# Patient Record
Sex: MALE | Race: WHITE | NOT HISPANIC OR LATINO | Employment: UNEMPLOYED | URBAN - METROPOLITAN AREA
[De-identification: names, ages, dates, MRNs, and addresses within clinical notes are randomized per-mention and may not be internally consistent; named-entity substitution may affect disease eponyms.]

---
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
## 2021-09-22 ENCOUNTER — HOSPITAL ENCOUNTER (EMERGENCY)
Facility: HOSPITAL | Age: 21
Discharge: HOME | End: 2021-09-22
Attending: EMERGENCY MEDICINE | Admitting: EMERGENCY MEDICINE
Payer: COMMERCIAL

## 2021-09-22 ENCOUNTER — APPOINTMENT (EMERGENCY)
Dept: CT IMAGING | Facility: HOSPITAL | Age: 21
End: 2021-09-22
Payer: COMMERCIAL

## 2021-09-22 VITALS
BODY MASS INDEX: 25.73 KG/M2 | RESPIRATION RATE: 20 BRPM | HEART RATE: 89 BPM | SYSTOLIC BLOOD PRESSURE: 152 MMHG | WEIGHT: 190 LBS | DIASTOLIC BLOOD PRESSURE: 81 MMHG | HEIGHT: 72 IN | TEMPERATURE: 97.9 F | OXYGEN SATURATION: 98 %

## 2021-09-22 DIAGNOSIS — R10.9 ABDOMINAL PAIN: Primary | ICD-10-CM

## 2021-09-22 LAB
ALBUMIN SERPL BCP-MCNC: 4.5 G/DL (ref 3.5–5)
ALP SERPL-CCNC: 98 U/L (ref 46–116)
ALT SERPL W P-5'-P-CCNC: 44 U/L (ref 12–78)
ANION GAP SERPL CALCULATED.3IONS-SCNC: 7 MMOL/L (ref 4–13)
AST SERPL W P-5'-P-CCNC: 16 U/L (ref 5–45)
BASOPHILS # BLD AUTO: 0.05 THOUSANDS/ΜL (ref 0–0.1)
BASOPHILS NFR BLD AUTO: 1 % (ref 0–1)
BILIRUB SERPL-MCNC: 0.39 MG/DL (ref 0.2–1)
BUN SERPL-MCNC: 14 MG/DL (ref 5–25)
CALCIUM SERPL-MCNC: 9.3 MG/DL (ref 8.3–10.1)
CHLORIDE SERPL-SCNC: 102 MMOL/L (ref 100–108)
CO2 SERPL-SCNC: 30 MMOL/L (ref 21–32)
CREAT SERPL-MCNC: 0.97 MG/DL (ref 0.6–1.3)
EOSINOPHIL # BLD AUTO: 0.06 THOUSAND/ΜL (ref 0–0.61)
EOSINOPHIL NFR BLD AUTO: 1 % (ref 0–6)
ERYTHROCYTE [DISTWIDTH] IN BLOOD BY AUTOMATED COUNT: 12.9 % (ref 11.6–15.1)
GFR SERPL CREATININE-BSD FRML MDRD: 110 ML/MIN/1.73SQ M
GLUCOSE SERPL-MCNC: 115 MG/DL (ref 65–140)
HCT VFR BLD AUTO: 44.7 % (ref 36.5–49.3)
HGB BLD-MCNC: 15 G/DL (ref 12–17)
IMM GRANULOCYTES # BLD AUTO: 0.01 THOUSAND/UL (ref 0–0.2)
IMM GRANULOCYTES NFR BLD AUTO: 0 % (ref 0–2)
LIPASE SERPL-CCNC: 88 U/L (ref 73–393)
LYMPHOCYTES # BLD AUTO: 2.28 THOUSANDS/ΜL (ref 0.6–4.47)
LYMPHOCYTES NFR BLD AUTO: 29 % (ref 14–44)
MCH RBC QN AUTO: 28.6 PG (ref 26.8–34.3)
MCHC RBC AUTO-ENTMCNC: 33.6 G/DL (ref 31.4–37.4)
MCV RBC AUTO: 85 FL (ref 82–98)
MONOCYTES # BLD AUTO: 0.47 THOUSAND/ΜL (ref 0.17–1.22)
MONOCYTES NFR BLD AUTO: 6 % (ref 4–12)
NEUTROPHILS # BLD AUTO: 4.97 THOUSANDS/ΜL (ref 1.85–7.62)
NEUTS SEG NFR BLD AUTO: 63 % (ref 43–75)
NRBC BLD AUTO-RTO: 0 /100 WBCS
PLATELET # BLD AUTO: 238 THOUSANDS/UL (ref 149–390)
PMV BLD AUTO: 9.5 FL (ref 8.9–12.7)
POTASSIUM SERPL-SCNC: 4.1 MMOL/L (ref 3.5–5.3)
PROT SERPL-MCNC: 8 G/DL (ref 6.4–8.2)
RBC # BLD AUTO: 5.24 MILLION/UL (ref 3.88–5.62)
SODIUM SERPL-SCNC: 139 MMOL/L (ref 136–145)
WBC # BLD AUTO: 7.84 THOUSAND/UL (ref 4.31–10.16)

## 2021-09-22 PROCEDURE — 74177 CT ABD & PELVIS W/CONTRAST: CPT

## 2021-09-22 PROCEDURE — G1004 CDSM NDSC: HCPCS

## 2021-09-22 PROCEDURE — 99284 EMERGENCY DEPT VISIT MOD MDM: CPT

## 2021-09-22 PROCEDURE — 36415 COLL VENOUS BLD VENIPUNCTURE: CPT | Performed by: PHYSICIAN ASSISTANT

## 2021-09-22 PROCEDURE — 83690 ASSAY OF LIPASE: CPT | Performed by: PHYSICIAN ASSISTANT

## 2021-09-22 PROCEDURE — 96360 HYDRATION IV INFUSION INIT: CPT

## 2021-09-22 PROCEDURE — 80053 COMPREHEN METABOLIC PANEL: CPT | Performed by: PHYSICIAN ASSISTANT

## 2021-09-22 PROCEDURE — 99284 EMERGENCY DEPT VISIT MOD MDM: CPT | Performed by: PHYSICIAN ASSISTANT

## 2021-09-22 PROCEDURE — 85025 COMPLETE CBC W/AUTO DIFF WBC: CPT | Performed by: PHYSICIAN ASSISTANT

## 2021-09-22 RX ADMIN — IOHEXOL 100 ML: 350 INJECTION, SOLUTION INTRAVENOUS at 16:03

## 2021-09-22 RX ADMIN — SODIUM CHLORIDE 1000 ML: 0.9 INJECTION, SOLUTION INTRAVENOUS at 15:44

## 2021-09-22 NOTE — ED PROVIDER NOTES
History  Chief Complaint   Patient presents with    Abdominal Pain     right upper quadrant pain reports it to be stabbing in nature which started last night  denies n,v,d      Patient presents to the emergency department today for evaluation right-sided abdominal pain  This is a very pleasant 23-year-old male who states that his symptoms began last evening around 1800 hours  This was not in relation to a meal   He states it has been waxing and waning since that point however worsening today  He had a similar episode about a month ago that lasted 15 hours before spontaneous resolution  He denies vomiting or appetite change, he denies constipation or diarrhea  Denies testicular pain or problems urinating  No fever  Pain may slightly radiating into the right side of the back upon questioning  He denies prior abdominal surgical history  None       History reviewed  No pertinent past medical history  History reviewed  No pertinent surgical history  History reviewed  No pertinent family history  I have reviewed and agree with the history as documented  E-Cigarette/Vaping     E-Cigarette/Vaping Substances     Social History     Tobacco Use    Smoking status: Never Smoker   Substance Use Topics    Alcohol use: No    Drug use: No       Review of Systems   Constitutional: Negative  HENT: Negative  Eyes: Negative  Respiratory: Negative  Cardiovascular: Negative  Gastrointestinal: Positive for abdominal pain  Endocrine: Negative  Genitourinary: Negative  Musculoskeletal: Positive for back pain  Skin: Negative  Allergic/Immunologic: Negative  Neurological: Negative  Hematological: Negative  Psychiatric/Behavioral: Negative  All other systems reviewed and are negative  Physical Exam  Physical Exam  Constitutional:       General: He is not in acute distress  Appearance: He is well-developed  He is not ill-appearing, toxic-appearing or diaphoretic  HENT:      Right Ear: External ear normal  No swelling  Tympanic membrane is not bulging  Left Ear: External ear normal  No swelling  Tympanic membrane is not bulging  Nose: Nose normal       Mouth/Throat:      Pharynx: No oropharyngeal exudate  Eyes:      General: Lids are normal       Conjunctiva/sclera: Conjunctivae normal       Pupils: Pupils are equal, round, and reactive to light  Neck:      Thyroid: No thyromegaly  Vascular: No JVD  Trachea: No tracheal deviation  Cardiovascular:      Rate and Rhythm: Normal rate and regular rhythm  Pulses: Normal pulses  Heart sounds: Normal heart sounds  No murmur heard  No friction rub  No gallop  Pulmonary:      Effort: Pulmonary effort is normal  No respiratory distress  Breath sounds: Normal breath sounds  No stridor  No wheezing or rales  Chest:      Chest wall: No tenderness  Abdominal:      General: Bowel sounds are normal  There is no distension  Palpations: Abdomen is soft  There is no mass  Tenderness: There is abdominal tenderness in the right upper quadrant and right lower quadrant  There is guarding  There is no rebound  Positive signs include Mooney's sign  Hernia: No hernia is present  Musculoskeletal:         General: Normal range of motion  Cervical back: Normal range of motion and neck supple  No edema  Normal range of motion  Lymphadenopathy:      Cervical: No cervical adenopathy  Skin:     General: Skin is warm and dry  Coloration: Skin is not pale  Findings: No erythema or rash  Neurological:      Mental Status: He is alert and oriented to person, place, and time  GCS: GCS eye subscore is 4  GCS verbal subscore is 5  GCS motor subscore is 6  Cranial Nerves: No cranial nerve deficit  Sensory: No sensory deficit  Deep Tendon Reflexes: Reflexes are normal and symmetric     Psychiatric:         Mood and Affect: Mood normal  Mood is not anxious or depressed           Speech: Speech normal          Behavior: Behavior normal          Vital Signs  ED Triage Vitals [09/22/21 1509]   Temperature Pulse Respirations Blood Pressure SpO2   97 9 °F (36 6 °C) 89 20 152/81 98 %      Temp Source Heart Rate Source Patient Position - Orthostatic VS BP Location FiO2 (%)   Tympanic Monitor Sitting Right arm --      Pain Score       --           Vitals:    09/22/21 1509   BP: 152/81   Pulse: 89   Patient Position - Orthostatic VS: Sitting         Visual Acuity      ED Medications  Medications   sodium chloride 0 9 % bolus 1,000 mL (1,000 mL Intravenous New Bag 9/22/21 1544)   iohexol (OMNIPAQUE) 350 MG/ML injection (SINGLE-DOSE) 100 mL (100 mL Intravenous Given 9/22/21 1603)       Diagnostic Studies  Results Reviewed     Procedure Component Value Units Date/Time    Comprehensive metabolic panel [[id number]] Collected: 09/22/21 1531    Lab Status: Final result Specimen: Blood from Arm, Right Updated: 09/22/21 1553     Sodium 139 mmol/L      Potassium 4 1 mmol/L      Chloride 102 mmol/L      CO2 30 mmol/L      ANION GAP 7 mmol/L      BUN 14 mg/dL      Creatinine 0 97 mg/dL      Glucose 115 mg/dL      Calcium 9 3 mg/dL      AST 16 U/L      ALT 44 U/L      Alkaline Phosphatase 98 U/L      Total Protein 8 0 g/dL      Albumin 4 5 g/dL      Total Bilirubin 0 39 mg/dL      eGFR 110 ml/min/1 73sq m     Narrative:      Ashley guidelines for Chronic Kidney Disease (CKD):     Stage 1 with normal or high GFR (GFR > 90 mL/min/1 73 square meters)    Stage 2 Mild CKD (GFR = 60-89 mL/min/1 73 square meters)    Stage 3A Moderate CKD (GFR = 45-59 mL/min/1 73 square meters)    Stage 3B Moderate CKD (GFR = 30-44 mL/min/1 73 square meters)    Stage 4 Severe CKD (GFR = 15-29 mL/min/1 73 square meters)    Stage 5 End Stage CKD (GFR <15 mL/min/1 73 square meters)  Note: GFR calculation is accurate only with a steady state creatinine    Lipase [[id number]]  (Normal) Collected: 09/22/21 1531    Lab Status: Final result Specimen: Blood from Arm, Right Updated: 09/22/21 1546     Lipase 88 u/L     CBC and differential [[id number]] Collected: 09/22/21 1531    Lab Status: Final result Specimen: Blood from Arm, Right Updated: 09/22/21 1539     WBC 7 84 Thousand/uL      RBC 5 24 Million/uL      Hemoglobin 15 0 g/dL      Hematocrit 44 7 %      MCV 85 fL      MCH 28 6 pg      MCHC 33 6 g/dL      RDW 12 9 %      MPV 9 5 fL      Platelets 979 Thousands/uL      nRBC 0 /100 WBCs      Neutrophils Relative 63 %      Immat GRANS % 0 %      Lymphocytes Relative 29 %      Monocytes Relative 6 %      Eosinophils Relative 1 %      Basophils Relative 1 %      Neutrophils Absolute 4 97 Thousands/µL      Immature Grans Absolute 0 01 Thousand/uL      Lymphocytes Absolute 2 28 Thousands/µL      Monocytes Absolute 0 47 Thousand/µL      Eosinophils Absolute 0 06 Thousand/µL      Basophils Absolute 0 05 Thousands/µL                  CT abdomen pelvis with contrast   Final Result by Aixa Acosta MD (09/22 1630)      Unremarkable CT of the abdomen and pelvis  Normal appendix  Workstation performed: [id number]                    Procedures  Procedures         ED Course  ED Course as of Sep 22 1657   Wed Sep 22, 2021   1519 SpO2: 98 %   1519 Respirations: 20   1519 Pulse: 89   1519 Temperature: 97 9 °F (36 6 °C)   1519 Blood Pressure: 152/81   1551 Lipase: 88   1551 WBC: 7 84   1551 Hemoglobin: 15 0   1551 Platelet Count: 066   1556 WBC: 7 84   1557 Hemoglobin: 15 0   1557 Platelet Count: 233   1557 Sodium: 139   1557 TOTAL BILIRUBIN: 0 39   1608 Awaiting ct read      8217 FINDINGS:     ABDOMEN     LOWER CHEST:  No clinically significant abnormality identified in the visualized lower chest      LIVER/BILIARY TREE:  Unremarkable      GALLBLADDER:  No calcified gallstones   No pericholecystic inflammatory change      SPLEEN:  Unremarkable      PANCREAS:  Unremarkable      ADRENAL GLANDS: Unremarkable      KIDNEYS/URETERS:  Unremarkable  No hydronephrosis      STOMACH AND BOWEL:  Unremarkable      APPENDIX:  A normal appendix was visualized      ABDOMINOPELVIC CAVITY:  No ascites  No pneumoperitoneum  No lymphadenopathy      VESSELS:  Unremarkable for patient's age      PELVIS     REPRODUCTIVE ORGANS:  Unremarkable for patient's age      URINARY BLADDER:  Unremarkable      ABDOMINAL WALL/INGUINAL REGIONS:  Unremarkable      OSSEOUS STRUCTURES:  No acute fracture or destructive osseous lesion      IMPRESSION:     Unremarkable CT of the abdomen and pelvis  Normal appendix                                    SBIRT 22yo+      Most Recent Value   SBIRT (22 yo +)   In order to provide better care to our patients, we are screening all of our patients for alcohol and drug use  Would it be okay to ask you these screening questions? No Filed at: 09/22/2021 1534                    MDM    Disposition  Final diagnoses:   Abdominal pain     Time reflects when diagnosis was documented in both MDM as applicable and the Disposition within this note     Time User Action Codes Description Comment    9/22/2021  4:56 PM Elton Hammad CALZADA Add [R10 9] Abdominal pain       ED Disposition     ED Disposition Condition Date/Time Comment    Discharge Stable Wed Sep 22, 2021  4:56 PM Ariane Edward discharge to home/self care              Follow-up Information     Follow up With Specialties Details Why Contact Info Additional Information    Calleen [place], MD Family Medicine Schedule an appointment as soon as possible for a visit  As needed [address]       [facility] Schedule an appointment as soon as possible for a visit  As needed [address] [address]  Renny Marrero [facility], Juan Alberto Hoang, [address] [place] [address]          Patient's Medications    No medications on file     No discharge procedures on file      PDMP Review     None          ED Provider  Electronically Signed by           Radu Zhang PA-C  09/22/21 0969

## 2023-06-18 ENCOUNTER — APPOINTMENT (EMERGENCY)
Dept: RADIOLOGY | Facility: HOSPITAL | Age: 23
End: 2023-06-18
Payer: COMMERCIAL

## 2023-06-18 ENCOUNTER — HOSPITAL ENCOUNTER (EMERGENCY)
Facility: HOSPITAL | Age: 23
Discharge: HOME | End: 2023-06-18
Attending: EMERGENCY MEDICINE
Payer: COMMERCIAL

## 2023-06-18 VITALS
BODY MASS INDEX: 23.1 KG/M2 | OXYGEN SATURATION: 97 % | DIASTOLIC BLOOD PRESSURE: 71 MMHG | WEIGHT: 165 LBS | TEMPERATURE: 98.7 F | HEART RATE: 96 BPM | SYSTOLIC BLOOD PRESSURE: 135 MMHG | HEIGHT: 71 IN | RESPIRATION RATE: 20 BRPM
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
## 2023-06-18 PROCEDURE — 99283 EMERGENCY DEPT VISIT LOW MDM: CPT

## 2023-06-18 PROCEDURE — 73090 X-RAY EXAM OF FOREARM: CPT

## 2023-06-18 PROCEDURE — 90715 TDAP VACCINE 7 YRS/> IM: CPT | Performed by: EMERGENCY MEDICINE

## 2023-06-18 PROCEDURE — 90471 IMMUNIZATION ADMIN: CPT

## 2023-06-18 PROCEDURE — 73130 X-RAY EXAM OF HAND: CPT

## 2023-06-18 RX ORDER — IBUPROFEN 800 MG/1
800 TABLET ORAL ONCE
Status: COMPLETED | OUTPATIENT
Start: 2023-06-18 | End: 2023-06-18

## 2023-06-18 RX ORDER — GINSENG 100 MG
1 CAPSULE ORAL 2 TIMES DAILY
Qty: 28 G | Refills: 0 | Status: SHIPPED | OUTPATIENT
Start: 2023-06-18

## 2023-06-18 RX ORDER — AMOXICILLIN AND CLAVULANATE POTASSIUM 875; 125 MG/1; MG/1
1 TABLET, FILM COATED ORAL ONCE
Status: COMPLETED | OUTPATIENT
Start: 2023-06-18 | End: 2023-06-18

## 2023-06-18 RX ORDER — GINSENG 100 MG
1 CAPSULE ORAL ONCE
Status: COMPLETED | OUTPATIENT
Start: 2023-06-18 | End: 2023-06-18

## 2023-06-18 RX ORDER — AMOXICILLIN AND CLAVULANATE POTASSIUM 875; 125 MG/1; MG/1
1 TABLET, FILM COATED ORAL EVERY 12 HOURS
Qty: 14 TABLET | Refills: 0 | Status: SHIPPED | OUTPATIENT
Start: 2023-06-18 | End: 2023-06-25

## 2023-06-18 RX ORDER — IBUPROFEN 800 MG/1
800 TABLET ORAL EVERY 8 HOURS PRN
Qty: 21 TABLET | Refills: 0 | Status: SHIPPED | OUTPATIENT
Start: 2023-06-18

## 2023-06-18 RX ADMIN — BACITRACIN ZINC 1 SMALL APPLICATION: 500 OINTMENT TOPICAL at 22:14

## 2023-06-18 RX ADMIN — IBUPROFEN 800 MG: 800 TABLET ORAL at 21:29

## 2023-06-18 RX ADMIN — AMOXICILLIN AND CLAVULANATE POTASSIUM 1 TABLET: 875; 125 TABLET, FILM COATED ORAL at 21:29

## 2023-06-18 RX ADMIN — TETANUS TOXOID, REDUCED DIPHTHERIA TOXOID AND ACELLULAR PERTUSSIS VACCINE, ADSORBED 0.5 ML: 5; 2.5; 8; 8; 2.5 SUSPENSION INTRAMUSCULAR at 21:29

## 2023-06-18 NOTE — Clinical Note
Rene Bryant was seen and treated in our emergency department on 6/18/2023  Other - See Comments        Diagnosis:     Trejo Current    He may return on this date:     No work until cleared by Orthopedic Surgeon  If you have any questions or concerns, please don't hesitate to call        Ritika Suarez MD    ______________________________           _______________          _______________  Hospital Representative                              Date                                Time

## 2023-06-19 ENCOUNTER — VBI (OUTPATIENT)
Dept: ADMINISTRATIVE | Facility: OTHER | Age: 23
End: 2023-06-19

## 2023-06-19 NOTE — ED PROVIDER NOTES
EMERGENCY DEPARTMENT ENCOUNTER NOTE    This note has been generated using a voice recognition software  There may be typographic, grammatic, or word substitution errors that have escaped editorial review  Emergency Department Note- Kelly Redd 24 y o  male MRN: [id number]    Unit/Bed#: Flavia Murrieta Encounter: [id number]  ? CHIEF COMPLAINT  Chief Complaint   Patient presents with   • Dog Bite     Patient reports was bite by his neighbors dog while trying to separate a fight between his dog and the neighbor dog  Unknown if the dog is up to date on shots  HPI  Kelly Redd is a 24 y o  male presenting with puncture wounds and lacerations after sustaining bites from his neighbors dog  Patient reports that shortly prior to arrival, neighbors dog answered his backyard and attacked his dog  Patient was attempting to separate the dogs and was bitten by the neighbors dog predominantly on the left wrist and forearm but also on the right hand  It is unclear whether the dog is up-to-date on immunizations  Patient's last tetanus shot was in 2012 and is out of date  Patient has a significant amount of pain, particularly in his left upper extremity  He has no other injuries  There is no numbness or weakness in either upper extremity  REVIEW OF SYSTEMS    Cardiac: no chest pain  Respiratory: no difficulty breathing  MSK: As above  Endocrine: no diabetes  Neuro: no new focal weakness or numbness    PAST MEDICAL HISTORY  History reviewed  No pertinent past medical history  SURGICAL HISTORY  History reviewed  No pertinent surgical history  FAMILY HISTORY  History reviewed  No pertinent family history  CURRENT MEDICATIONS  No current facility-administered medications on file prior to encounter  No current outpatient medications on file prior to encounter         ALLERGIES  No Known Allergies    SOCIAL HISTORY  Social History     Socioeconomic History   • Marital status: Single     Spouse name: None   • "Number of children: None   • Years of education: None   • Highest education level: None   Occupational History   • None   Tobacco Use   • Smoking status: Never   • Smokeless tobacco: None   Substance and Sexual Activity   • Alcohol use: No   • Drug use: No   • Sexual activity: None   Other Topics Concern   • None   Social History Narrative   • None     Social Determinants of Health     Financial Resource Strain: Not on file   Food Insecurity: Not on file   Transportation Needs: Not on file   Physical Activity: Not on file   Stress: Not on file   Social Connections: Not on file   Intimate Partner Violence: Not on file   Housing Stability: Not on file       PHYSICAL EXAM    /71 (BP Location: Right arm)   Pulse 96   Temp 98 7 °F (37 1 °C) (Temporal)   Resp 20   Ht 5' 11\" (1 803 m)   Wt 74 8 kg (165 lb)   SpO2 97%   BMI 23 01 kg/m²   Vital signs and nursing notes reviewed    Constitutional:  Awake, alert, oriented  No acute distress  HEENT:  Normocephalic, atraumatic  Sclera anicteric, conjunctiva not injected  Moist oral mucosa  Cardiac:  Appears well-perfused  Respiratory:  Breathing comfortably on room air  Abdomen:  Nondistended  Extremities: Examination of left upper extremity reveals puncture wounds and lacerations to patient's mid and distal forearm as well as superficial abrasions and superficial lacerations to left hand  There are also several superficial abrasions and lacerations to patient's right hand, particularly to the second (index) finger distal interphalangeal joint  Patient is able to move wrists bilaterally, though movement of left wrist is painful, he is able to demonstrate intact movement and MCPs, PIPs, and DIPs in all fingers of bilateral upper extremities  Sensation to light touch is intact in median, radial, and ulnar nerve distributions bilaterally  2+ radial pulses bilaterally    Integument: As per extremities exam, cap refill less than 2 seconds  Neurologic:  Awake, " alert, and oriented x3  Nonfocal exam   Psychiatric:  Normal affect    ? LABS AND TESTS    Results Reviewed     None          XR forearm 2 views LEFT   ED Interpretation by Denys Marshall MD (06/18 2118)   Gas in tissues due to dog bite puncture wounds, no obvious fractures, no radiopaque foreign bodies  Formal read pending  Final Result by Heath Schofield MD (06/19 8426)      No acute osseous abnormality  Workstation performed: [id number]         XR hand 3+ views LEFT   ED Interpretation by Denys Marshall MD (06/18 2119)   No acute fracture or dislocation  No radiopaque foreign bodies  Formal read pending  Final Result by Sugey Porter MD (06/19 2217)      No acute osseous abnormality  Workstation performed: [id number]             ED COURSE & MEDICAL DECISION MAKING  Procedures             Medications   amoxicillin-clavulanate (AUGMENTIN) 875-125 mg per tablet 1 tablet (1 tablet Oral Given 6/18/23 2129)   ibuprofen (MOTRIN) tablet 800 mg (800 mg Oral Given 6/18/23 2129)   tetanus-diphtheria-acellular pertussis (BOOSTRIX) IM injection 0 5 mL (0 5 mL Intramuscular Given 6/18/23 2129)   bacitracin topical ointment 1 small application (1 small application Topical Given 6/18/23 254)     57-year-old male presenting with dog bite wounds to bilateral upper extremities, left worse than right  Vital signs reviewed, afebrile, within normal limits  Police are involved  At present, it is unclear whether the neighbors dog is up-to-date on immunizations  If the dog is found to not be up-to-date on immunizations, patient is to return to emergency department for rabies series  Thankfully, we do have time to initiate the series given distal location of the bites relative to the brain  Patient's tetanus updated  X-rays of left hand and forearm obtained, to my review no fractures, no radiopaque foreign bodies  Motrin administered for pain  First dose of Augmentin administered  I irrigated all the lacerations and abrasions  Unfortunately, no repair is possible due to increased risk of infection  Bacitracin applied to the most extensive wounds  Patient discharged home with Renee Davis for Augmentin, recommendations for pain control, strict return precautions and recommendations for follow-up  Medical Decision Making  Dog bite, initial encounter: acute illness or injury  Amount and/or Complexity of Data Reviewed  Radiology: ordered and independent interpretation performed  Decision-making details documented in ED Course  Risk  OTC drugs  Prescription drug management  CLINICAL IMPRESSION  Final diagnoses:   Dog bite, initial encounter       DISPOSITION  Time reflects when diagnosis was documented in both MDM as applicable and the Disposition within this note     Time User Action Codes Description Comment    6/18/2023  9:19 PM Yunier Yin  0XXA] Dog bite, initial encounter       ED Disposition     ED Disposition   Discharge    Condition   Stable    Date/Time   Sun Jun 18, 2023  9:19 PM    Comment   [address] discharge to home/self care                 Follow-up Information     Follow up With Specialties Details Why Contact Info Additional [address] [facility] Orthopedic Surgery Schedule an appointment as soon as possible for a visit in 3 days Emergency Room Follow-up [address] [facility],[address] [phone]  [address] [facility] Davina Broussard [address] [facility], [facility], [place], [address]    [facility] Emergency Department Emergency Medicine Go to  As needed, If symptoms worsen [address] [phone]  [address] [facility] Emergency Department, [address], [address]   Discharge Medication List as of 6/18/2023  9:57 PM      START taking these medications Details   amoxicillin-clavulanate (AUGMENTIN) 875-125 mg per tablet Take 1 tablet by mouth every 12 (twelve) hours for 7 days, Starting Sun 6/18/2023, Until Sun 6/25/2023, Normal      bacitracin topical ointment 500 units/g topical ointment Apply 1 large application topically 2 (two) times a day, Starting Sun 6/18/2023, Normal      ibuprofen (MOTRIN) 800 mg tablet Take 1 tablet (800 mg total) by mouth every 8 (eight) hours as needed for mild pain or moderate pain, Starting Sun 6/18/2023, Normal                 Kwesi Pride MD  06/24/23 8787

## 2023-06-19 NOTE — DISCHARGE INSTRUCTIONS
Please take Augmentin and complete entire course of the antibiotic to help minimize chance of infection due to the dog bite  Take ibuprofen 800 mg every 8 hours as needed for pain, you may also take Tylenol 500 mg every 4-6 hours for pain, do not exceed 4000 mg of Tylenol in 24 hours  You may apply bacitracin to your lacerations to help promote healing, apply the ointment twice daily while the wounds are open  Please follow-up with orthopedics for reevaluation of your dog bites, particularly to your wrist and hand  Seek medical attention if you have worsening swelling, pain, redness, pus draining from the wounds, red streaks traveling up your arm, or fevers  You may shower, let water run over your wounds and pat the wounds dry  Do not submerge the wounds in water and do not swim in a pool or lake

## 2023-06-19 NOTE — TELEPHONE ENCOUNTER
Rubina Mitchell    ED Visit Information     Ed visit RNRN:[id number]  Diagnosis Description: dog bite   In Network? Yes Kurt Miller  Discharge status: Home  Discharged with meds ? Yes  Number of ED visits to date: 1  ED Severity:3     Outreach Information    Outreach successful: Yes 1  Date letter mailed:NA  Date [address] Coordination    Follow up with specialist 6-21-23  Transportation issues ?  NA    [employer] type: 3 Day Outreach  Emergent necessity warranted by diagnosis: Yes  [facility] PCP: Yes  Transportation: Self Transport  Called PCP first?: No  Feels able to call PCP for urgent problems ?: Yes  Understands what emergencies can be handled by PCP ?: Yes  Ever any problems getting appointment with PCP for minor emergency/urgency problems?: No  Practice Contacted Patient ?: No  Pt had ED follow up with pcp/staff ?: No    Reason Patient went to ED instead of Urgent Care or PCP?: Perceived Severity of Illness  Urgent care Education?: Yes  06/19/2023 03:12 PM EDT by Kiki Starr MA 06/19/2023 03:12 PM EDT by SHAUNA Roca (Self) --  - Remove  - Communicated - spoke to patient has appt with [facility] on 6-21-23

## 2023-06-19 NOTE — ED NOTES
Pt given discharge instructions by provider, pt verbalized understanding and ambulated to the exit without difficulty       Yeny Webb RN  06/18/23 6826

## 2023-08-30 ENCOUNTER — APPOINTMENT (EMERGENCY)
Dept: RADIOLOGY | Facility: HOSPITAL | Age: 23
End: 2023-08-30
Payer: COMMERCIAL

## 2023-08-30 ENCOUNTER — HOSPITAL ENCOUNTER (EMERGENCY)
Facility: HOSPITAL | Age: 23
Discharge: HOME | End: 2023-08-30
Attending: EMERGENCY MEDICINE | Admitting: EMERGENCY MEDICINE
Payer: COMMERCIAL

## 2023-08-30 VITALS
OXYGEN SATURATION: 98 % | SYSTOLIC BLOOD PRESSURE: 113 MMHG | TEMPERATURE: 97 F | BODY MASS INDEX: 21.62 KG/M2 | DIASTOLIC BLOOD PRESSURE: 64 MMHG | WEIGHT: 155 LBS | HEART RATE: 73 BPM | RESPIRATION RATE: 19 BRPM

## 2023-08-30 DIAGNOSIS — K80.20 CHOLELITHIASIS: Primary | ICD-10-CM

## 2023-08-30 DIAGNOSIS — R11.0 NAUSEA: ICD-10-CM

## 2023-08-30 DIAGNOSIS — K80.50 BILIARY COLIC: ICD-10-CM

## 2023-08-30 DIAGNOSIS — R10.9 ABDOMINAL PAIN: ICD-10-CM

## 2023-08-30 LAB
ALBUMIN SERPL BCP-MCNC: 5.3 G/DL (ref 3.5–5)
ALP SERPL-CCNC: 85 U/L (ref 34–104)
ALT SERPL W P-5'-P-CCNC: 25 U/L (ref 7–52)
ANION GAP SERPL CALCULATED.3IONS-SCNC: 8 MMOL/L
AST SERPL W P-5'-P-CCNC: 18 U/L (ref 13–39)
ATRIAL RATE: 83 BPM
BASOPHILS # BLD AUTO: 0.04 THOUSANDS/ÂΜL (ref 0–0.1)
BASOPHILS NFR BLD AUTO: 1 % (ref 0–1)
BILIRUB SERPL-MCNC: 0.68 MG/DL (ref 0.2–1)
BUN SERPL-MCNC: 18 MG/DL (ref 5–25)
CALCIUM SERPL-MCNC: 10.1 MG/DL (ref 8.4–10.2)
CHLORIDE SERPL-SCNC: 100 MMOL/L (ref 96–108)
CO2 SERPL-SCNC: 31 MMOL/L (ref 21–32)
CREAT SERPL-MCNC: 0.99 MG/DL (ref 0.6–1.3)
EOSINOPHIL # BLD AUTO: 0.06 THOUSAND/ÂΜL (ref 0–0.61)
EOSINOPHIL NFR BLD AUTO: 1 % (ref 0–6)
ERYTHROCYTE [DISTWIDTH] IN BLOOD BY AUTOMATED COUNT: 14.1 % (ref 11.6–15.1)
GFR SERPL CREATININE-BSD FRML MDRD: 106 ML/MIN/1.73SQ M
GLUCOSE SERPL-MCNC: 105 MG/DL (ref 65–140)
HCT VFR BLD AUTO: 50.6 % (ref 36.5–49.3)
HGB BLD-MCNC: 16.5 G/DL (ref 12–17)
IMM GRANULOCYTES # BLD AUTO: 0.01 THOUSAND/UL (ref 0–0.2)
IMM GRANULOCYTES NFR BLD AUTO: 0 % (ref 0–2)
LIPASE SERPL-CCNC: 14 U/L (ref 11–82)
LYMPHOCYTES # BLD AUTO: 1.93 THOUSANDS/ÂΜL (ref 0.6–4.47)
LYMPHOCYTES NFR BLD AUTO: 30 % (ref 14–44)
MCH RBC QN AUTO: 29.3 PG (ref 26.8–34.3)
MCHC RBC AUTO-ENTMCNC: 32.6 G/DL (ref 31.4–37.4)
MCV RBC AUTO: 90 FL (ref 82–98)
MONOCYTES # BLD AUTO: 0.42 THOUSAND/ÂΜL (ref 0.17–1.22)
MONOCYTES NFR BLD AUTO: 7 % (ref 4–12)
NEUTROPHILS # BLD AUTO: 4.01 THOUSANDS/ÂΜL (ref 1.85–7.62)
NEUTS SEG NFR BLD AUTO: 61 % (ref 43–75)
NRBC BLD AUTO-RTO: 0 /100 WBCS
P AXIS: 69 DEGREES
PLATELET # BLD AUTO: 225 THOUSANDS/UL (ref 149–390)
PMV BLD AUTO: 10.3 FL (ref 8.9–12.7)
POTASSIUM SERPL-SCNC: 4.4 MMOL/L (ref 3.5–5.3)
PR INTERVAL: 146 MS
PROT SERPL-MCNC: 7.9 G/DL (ref 6.4–8.4)
QRS AXIS: 84 DEGREES
QRSD INTERVAL: 84 MS
QT INTERVAL: 380 MS
QTC INTERVAL: 446 MS
RBC # BLD AUTO: 5.63 MILLION/UL (ref 3.88–5.62)
SODIUM SERPL-SCNC: 139 MMOL/L (ref 135–147)
T WAVE AXIS: 65 DEGREES
VENTRICULAR RATE: 83 BPM
WBC # BLD AUTO: 6.47 THOUSAND/UL (ref 4.31–10.16)

## 2023-08-30 PROCEDURE — 71046 X-RAY EXAM CHEST 2 VIEWS: CPT

## 2023-08-30 PROCEDURE — 76775 US EXAM ABDO BACK WALL LIM: CPT | Performed by: EMERGENCY MEDICINE

## 2023-08-30 PROCEDURE — 93010 ELECTROCARDIOGRAM REPORT: CPT | Performed by: INTERNAL MEDICINE

## 2023-08-30 PROCEDURE — 85025 COMPLETE CBC W/AUTO DIFF WBC: CPT

## 2023-08-30 PROCEDURE — 93005 ELECTROCARDIOGRAM TRACING: CPT

## 2023-08-30 PROCEDURE — 83690 ASSAY OF LIPASE: CPT

## 2023-08-30 PROCEDURE — 99284 EMERGENCY DEPT VISIT MOD MDM: CPT

## 2023-08-30 PROCEDURE — 36415 COLL VENOUS BLD VENIPUNCTURE: CPT

## 2023-08-30 PROCEDURE — 99285 EMERGENCY DEPT VISIT HI MDM: CPT | Performed by: EMERGENCY MEDICINE

## 2023-08-30 PROCEDURE — 80053 COMPREHEN METABOLIC PANEL: CPT

## 2023-08-30 NOTE — DISCHARGE INSTRUCTIONS
Thank you for coming to the ER today. Please follow up with your primary care doctor in 1-2 days to be re-evaluated. If at any point you experience any new or worsening symptoms do not hesitate to come back to the hospital to be evaluated. Please schedule a follow up appointment with surgery, number is listed above. Thank you and hope you have a great rest of your day.

## 2023-08-30 NOTE — ED ATTENDING ATTESTATION
8/30/2023  Jaime Cardona DO, saw and evaluated the patient. I have discussed the patient with the resident/non-physician practitioner and agree with the resident's/non-physician practitioner's findings, Plan of Care, and MDM as documented in the resident's/non-physician practitioner's note, except where noted. All available labs and Radiology studies were reviewed. I was present for key portions of any procedure(s) performed by the resident/non-physician practitioner and I was immediately available to provide assistance. At this point I agree with the current assessment done in the Emergency Department. I have conducted an independent evaluation of this patient a history and physical is as follows:    76-year-old male presents for recurrence of right upper quadrant abdominal pain, radiating into his right lower chest.  Symptoms started this morning, couple hours after eating breakfast.  He had similar symptoms once before which was evaluated as biliary colic but for which he did not follow-up. He has had some nausea but no vomiting. No other systemic illness. Symptoms are sharp and cramping in nature and seem to come in waves, lasting about an hour. His pain has improved since onset. He knows he has gallstones. No hematochezia, melena or hematemesis. No change in symptoms w/BM or voiding. No recent travel or similar sick contacts. Denies f/c, CP, SOB, v/d or dysuria. 12 system ROS o/w negative. PE: NAD, appears comfortable, alert; PERRL, EOMI; MMM, no posterior oropharyngeal exudate, edema or erythema; HRR, no murmur, monitor shows sinus rhythm at 72 bpm; lungs CTA w/o w/r/r, POx 98% on RA (nl); abdomen s/nt/nd, no r/g/r, (-) Rovsing's, nl BS in all 4 quadrant; (-) LE edema or calf TTP, FROM extremities x4; skin p/w/d; CNs GI/NF, oriented.     MDM/DDx: RUQ abdominal pain -symptomatic cholelithiasis, biliary colic, less likely cholecystitis, at risk for pancreatitis, hepatitis, hepatic steatosis, less likely atypical cardiac etiology or pulmonary etiology. I independently reviewed and interpreted ordered labs and EKG from this encounter. A/P: Will check POCUS, abdominal labs, EKG, treat symptoms, reevaluate for further work up and disposition.     ED Course         Critical Care Time  Procedures

## 2023-08-30 NOTE — ED PROVIDER NOTES
History  Chief Complaint   Patient presents with   • Abdominal Pain     Right sided abdominal pain radiating to chest. Onset 1130 this AM suddenly. Pain described as sharp in nature and intermittent. Patient also reporting nausea     Patient is a 79-year-old male presenting to the emergency department for evaluation of right-sided abdominal pain that radiates up towards his chest that occurred around 1130 this morning. Patient states he was not doing anything during this event and happen randomly when he was sitting at his desk. Patient states he has had this happen 1 time in the past came to the hospital was negative for acute pathology. Patient states he has not had this happen since but it felt exactly the same. It was associated with some nausea. The pain was intermittent in nature and lasted about an hour. Patient states his pain has subsided since. As well as his nausea did go away. He denies any fevers or chills chest pain or shortness of breath he denies any diarrhea, constipation, dysuria, hematuria. He denies any recent viral illnesses. He denies any palpitations. Prior to Admission Medications   Prescriptions Last Dose Informant Patient Reported? Taking?   bacitracin topical ointment 500 units/g topical ointment   No No   Sig: Apply 1 large application topically 2 (two) times a day   ibuprofen (MOTRIN) 800 mg tablet   No No   Sig: Take 1 tablet (800 mg total) by mouth every 8 (eight) hours as needed for mild pain or moderate pain      Facility-Administered Medications: None       History reviewed. No pertinent past medical history. History reviewed. No pertinent surgical history. History reviewed. No pertinent family history. I have reviewed and agree with the history as documented.     E-Cigarette/Vaping     E-Cigarette/Vaping Substances     Social History     Tobacco Use   • Smoking status: Never   Substance Use Topics   • Alcohol use: No   • Drug use: No        Review of Systems   Constitutional: Positive for activity change. Negative for chills, fatigue and fever. HENT: Negative for congestion, ear pain and sore throat. Eyes: Negative for pain and visual disturbance. Respiratory: Negative for cough, chest tightness and shortness of breath. Cardiovascular: Negative for chest pain and palpitations. Gastrointestinal: Positive for abdominal pain and nausea. Negative for blood in stool, constipation, diarrhea and vomiting. Genitourinary: Negative for dysuria and hematuria. Musculoskeletal: Negative for arthralgias, back pain and neck pain. Skin: Negative for color change and rash. Neurological: Negative for dizziness, seizures, syncope, weakness, light-headedness, numbness and headaches. Psychiatric/Behavioral: Negative for agitation and behavioral problems. All other systems reviewed and are negative. Physical Exam  ED Triage Vitals [08/30/23 1229]   Temperature Pulse Respirations Blood Pressure SpO2   (!) 97 °F (36.1 °C) 82 16 129/85 98 %      Temp Source Heart Rate Source Patient Position - Orthostatic VS BP Location FiO2 (%)   Tympanic Monitor Sitting Left arm --      Pain Score       2             Orthostatic Vital Signs  Vitals:    08/30/23 1229   BP: 129/85   Pulse: 82   Patient Position - Orthostatic VS: Sitting       Physical Exam  Vitals and nursing note reviewed. Constitutional:       General: He is not in acute distress. Appearance: He is well-developed. HENT:      Head: Normocephalic and atraumatic. Mouth/Throat:      Mouth: Mucous membranes are moist.   Eyes:      Extraocular Movements: Extraocular movements intact. Conjunctiva/sclera: Conjunctivae normal.   Cardiovascular:      Rate and Rhythm: Normal rate and regular rhythm. Heart sounds: No murmur heard. Pulmonary:      Effort: Pulmonary effort is normal. No respiratory distress. Breath sounds: Normal breath sounds. Abdominal:      General: Abdomen is flat. Palpations: Abdomen is soft. Tenderness: There is no abdominal tenderness. There is no right CVA tenderness, left CVA tenderness or guarding. Negative signs include Mooney's sign, Rovsing's sign and McBurney's sign. Musculoskeletal:         General: No swelling. Cervical back: Neck supple. Skin:     General: Skin is warm and dry. Capillary Refill: Capillary refill takes less than 2 seconds. Neurological:      General: No focal deficit present. Mental Status: He is alert and oriented to person, place, and time.    Psychiatric:         Mood and Affect: Mood normal.         Behavior: Behavior normal.         ED Medications  Medications - No data to display    Diagnostic Studies  Results Reviewed     Procedure Component Value Units Date/Time    Comprehensive metabolic panel [[id number]]  (Abnormal) Collected: 08/30/23 1242    Lab Status: Final result Specimen: Blood from Arm, Right Updated: 08/30/23 1303     Sodium 139 mmol/L      Potassium 4.4 mmol/L      Chloride 100 mmol/L      CO2 31 mmol/L      ANION GAP 8 mmol/L      BUN 18 mg/dL      Creatinine 0.99 mg/dL      Glucose 105 mg/dL      Calcium 10.1 mg/dL      AST 18 U/L      ALT 25 U/L      Alkaline Phosphatase 85 U/L      Total Protein 7.9 g/dL      Albumin 5.3 g/dL      Total Bilirubin 0.68 mg/dL      eGFR 106 ml/min/1.73sq m     Narrative:      [place] guidelines for Chronic Kidney Disease (CKD):   •  Stage 1 with normal or high GFR (GFR > 90 mL/min/1.73 square meters)  •  Stage 2 Mild CKD (GFR = 60-89 mL/min/1.73 square meters)  •  Stage 3A Moderate CKD (GFR = 45-59 mL/min/1.73 square meters)  •  Stage 3B Moderate CKD (GFR = 30-44 mL/min/1.73 square meters)  •  Stage 4 Severe CKD (GFR = 15-29 mL/min/1.73 square meters)  •  Stage 5 End Stage CKD (GFR <15 mL/min/1.73 square meters)  Note: GFR calculation is accurate only with a steady state creatinine    Lipase [[id number]]  (Normal) Collected: 08/30/23 1242 Lab Status: Final result Specimen: Blood from Arm, Right Updated: 08/30/23 1302     Lipase 14 u/L     CBC and differential [[id number]]  (Abnormal) Collected: 08/30/23 1242    Lab Status: Final result Specimen: Blood from Arm, Right Updated: 08/30/23 1247     WBC 6.47 Thousand/uL      RBC 5.63 Million/uL      Hemoglobin 16.5 g/dL      Hematocrit 50.6 %      MCV 90 fL      MCH 29.3 pg      MCHC 32.6 g/dL      RDW 14.1 %      MPV 10.3 fL      Platelets 889 Thousands/uL      nRBC 0 /100 WBCs      Neutrophils Relative 61 %      Immat GRANS % 0 %      Lymphocytes Relative 30 %      Monocytes Relative 7 %      Eosinophils Relative 1 %      Basophils Relative 1 %      Neutrophils Absolute 4.01 Thousands/µL      Immature Grans Absolute 0.01 Thousand/uL      Lymphocytes Absolute 1.93 Thousands/µL      Monocytes Absolute 0.42 Thousand/µL      Eosinophils Absolute 0.06 Thousand/µL      Basophils Absolute 0.04 Thousands/µL                  XR chest 2 views   ED Interpretation by Skylar Gómez DO (08/30 1257)   No acute cardiopulmonary pathology            Procedures  POC Biliary US    Date/Time: 8/30/2023 12:41 PM    Performed by: Liv Figueroa DO  Authorized by:  Liv Figueroa DO    Patient location:  ED  Performed by:  Resident  Procedure details:     Exam Type:  Diagnostic, therapeutic and educational    Indications: upper right quadrant abdominal pain, epigastric pain and nausea      Assessment for:  Cholecystitis and cholelithiasis    Views obtained: gallbladder (transverse and longitudinal) and liver      Image quality: limited diagnostic      Image availability:  Images available in PACS  Findings:     Cholelithiasis: identified      Common bile duct:  Unable to visualize    Gallbladder wall:  Normal    Pericholecystic fluid: not identified      Sonographic Mooney's sign: negative      Polyps: not identified      Mass: not identified    Interpretation:     Biliary ultrasound impressions: cholelithiasis    Comments:      Biliary sludge as well as gallstones  ECG 12 Lead Documentation Only    Date/Time: 8/30/2023 12:45 PM    Performed by: Sara Davis DO  Authorized by: Sara Davis DO    Indications / Diagnosis:  Cp  ECG reviewed by me, the ED Provider: yes    Patient location:  ED  Previous ECG:     Previous ECG:  Unavailable  Interpretation:     Interpretation: normal    Rate:     ECG rate:  80    ECG rate assessment: normal    Rhythm:     Rhythm: sinus rhythm    Ectopy:     Ectopy: none    QRS:     QRS axis:  Normal    QRS intervals:  Normal  Conduction:     Conduction: normal    ST segments:     ST segments:  Normal  T waves:     T waves: normal            ED Course  ED Course as of 08/30/23 1318   Wed Aug 30, 2023   1242 Blood Pressure: 129/85   1242 Temperature(!): 97 °F (36.1 °C)   1242 Temp Source: Tympanic   1242 Pulse: 82   1242 Respirations: 16   1242 SpO2: 98 %  Patient 34-year-old male presenting to the emergency department for evaluation of right upper abdominal pain which radiates towards his chest.  It is associated with nausea no diaphoresis dizziness tinnitus vision change neck pain back pain numbness or tingling in any of his extremities. He denies any dysuria hematuria testicular pain or discomfort. Patient states this happened 1 time in the past.  No significant past medical history. On exam his abdomen soft nontender nondistended. Point-of-care biliary ultrasound was performed which shows patient is well-hydrated IVC was not collapsible. Patient states he stays very hydrated. Gallbladder was significant for biliary sludge as well as gallstones. There is no positive Mooney sign no pericholecystic fluid in the gall bladder wall thickness was within normal limits. Lungs are clear to auscultation bilaterally heart sounds within normal limits. Likely biliary colic given the point-of-care ultrasound findings.   Will evaluate patient for pancreatitis with lipase Will evaluate for anemia or leukocytosis with CBC we will also evaluate for electrolyte abnormalities/elevated liver enzymes as well as renal function with CMP. We will also evaluate patient for dysrhythmia with EKG we will also get a two-view chest x-ray to rule out pneumothorax or pneumonia as a cause of patient's pain. Spoke with patient and informed him that regardless of the results of today we will have patient follow-up with surgery given that he does have biliary sludge and gallstones. Patient is aware and is appreciative with follow-up. Patient does not want anything for his nausea or pain at this time. We will frequently reevaluate patient. Patient will likely be discharged home. 1247 WBC: 6.47  No leukocytosis    1248 Hemoglobin: 16.5  No anemia, around baseline   1302 Lipase: 14   1309 TOTAL BILIRUBIN: 0.68   1309 ALT: 25   1309 AST: 18   1318 Pt re-examined and evaluated after testing and treatment. Patient informed of all lab and imaging findings. Spoke with the patient and feeling improved and sxs have resolved. Will discharge home with close f/u with pcp and instructed to return to the ED if sxs worsen or continue. Pt agrees with the plan for discharge and feels comfortable to go home with proper f/u. Advised to return for worsening or additional problems. Diagnostic tests were reviewed and questions answered. Diagnosis, care plan and treatment options were discussed. The patient understand instructions and will follow up as directed. Advised to follow up with their pcp in a few days for re-evaluation. Also advised to call and schedule an appointment with general surgery for further evaluation and workup. Advised to continue symptomatic care with over the counter mediations. Patient is stable for discharge. SBIRT 20yo+    Flowsheet Row Most Recent Value   Initial Alcohol Screen: US AUDIT-C     1.  How often do you have a drink containing alcohol? 0 Filed at: 08/30/2023 1233   2. How many drinks containing alcohol do you have on a typical day you are drinking? 0 Filed at: 08/30/2023 1233   3a. Male UNDER 65: How often do you have five or more drinks on one occasion? 0 Filed at: 08/30/2023 1233   3b. FEMALE Any Age, or MALE 65+: How often do you have 4 or more drinks on one occassion? 0 Filed at: 08/30/2023 1233   Audit-C Score 0 Filed at: 08/30/2023 1233   CRISTAL: How many times in the past year have you. .. Used an illegal drug or used a prescription medication for non-medical reasons? Never Filed at: 08/30/2023 1233                MDM      Disposition  Final diagnoses:   Cholelithiasis   Biliary colic   Abdominal pain   Nausea     Time reflects when diagnosis was documented in both MDM as applicable and the Disposition within this note     Time User Action Codes Description Comment    8/30/2023 12:46 PM [place] Evdin Add [K80.20] Cholelithiasis     8/30/2023 12:46 PM [place] Edvin Add [O89.39] Biliary colic     4/77/4916 20:16 PM [place] Edvin Add [R10.9] Abdominal pain     8/30/2023 12:46 PM [place] Edvin Add [R11.0] Nausea       ED Disposition     ED Disposition   Discharge    Condition   Stable    Date/Time   Wed Aug 30, 2023  1:09 PM    [address] discharge to home/self care.                Follow-up Information     Follow up With Specialties Details Why Contact Info Additional Information    Rubina Lu MD [facility] Medicine Schedule an appointment as soon as possible for a visit  for follow up [address] [place]       [address] [facility] Emergency Department Emergency Medicine Go to  As needed, If symptoms worsen [address] [facility] [address] Mamta Cruz [phone] [address] [place] [address] Emergency Department, [address] [facility], [address] [place] [address] General Surgery Schedule an appointment as soon as possible for a visit  for follow up [address]  [id number]  [address] [facility], [address] [place], [place] [address] [phone]          Patient's Medications   Discharge Prescriptions    No medications on file     No discharge procedures on file. PDMP Review     None           ED Provider  Attending physically available and evaluated Leslie Morelos. I managed the patient along with the ED Attending.     Electronically Signed by         Vicente Maki DO  08/30/23 1315 [place] DO Dao  08/30/23 4516

## 2023-08-31 ENCOUNTER — VBI (OUTPATIENT)
Dept: ADMINISTRATIVE | Facility: OTHER | Age: 23
End: 2023-08-31

## 2023-08-31 NOTE — TELEPHONE ENCOUNTER
Erendira Rondon    ED Visit Information     Ed visit date: 08/30/2023  Diagnosis Description: Cholelithiasis; Biliary colic; Abdominal pain; Nausea  In Network? Yes Cece Oconnorraza  Discharge status: Home  Discharged with meds ? No  Number of ED visits to date: 2  ED Severity:3     Outreach Information    Outreach successful: No 3  Date letter mailed:09/01/2023  Date Finalized:09/01/2023    Care Coordination    Follow up appointment with pcp: no no  Transportation issues ?  NA    Value Base Outreach    Outreach type: 3 Day Outreach  Emergent necessity warranted by diagnosis: Yes  [facility] PCP: Yes  Transportation: Self Transport  08/31/2023 10:40 AM EDT by Oracio Yancey MA 08/31/2023 10:40 AM EDT by Oracio Yancey MA  Outgoing Erendira Rondon (Self) [phone] (GXDU)[phone] (Home)  [phone] (Home) Remove  - Left MessageCommunicated - Mother's phone  08/31/2023 03:40 PM EDT by Oracio Yancey MA 08/31/2023 03:40 PM EDT by Oracio Yancey MA  Outgoing Erendira Rondon (Self) [phone] (NAMP)[phone] (Home)  [phone] (Home) Remove  - Left Message  09/01/2023 10:05 AM EDT by Oracio Yancey MA 09/01/2023 10:05 AM EDT by Oracio Yancey MA  Outgoing Erendira Rondon (Self) [phone] (BDKI)[phone] (Home)  [phone] (Home) Remove  - Left Message

## 2023-08-31 NOTE — LETTER
Date: 09/01/23    Myesha Bourne  [address] [place] [address] [address] [address]    Dear Félix Easton:                                                                                                                              Thank you for choosing [facility] emergency department for care. Your primary care provider wants to make sure that your ongoing medical care is being addressed. If you require follow up care as a result of your emergency department visit, there are a few things the practice would like you to know. As part of the network's continuing commitment to caring for our patients, we have added more same day appointments and have extended office hours to meet your medical needs. After hours, on-call physicians are available via your primary care provider's main office line. We encourage you to contact our office prior to seeking treatment to discuss your symptoms with the medical staff. Together, we can determine the correct course of action. A majority of non-emergent conditions such as: common cold, flu-like symptoms, fevers, strains/sprains, dislocations, minor burns, cuts and animal bites can be treated at [facility] facilities. Diagnostic testing is available at some sites. Of course, if you are experiencing a life threatening medical emergency call 911 or proceed directly to the nearest emergency room.     Your nearest [facility] facility is conveniently located at:    [facility]  [address] [facility], [address]  [phone]  [address] [place] [address] offered at most [facility] locations  [address] [place] [address] Ave your spot online at [email or web address] or on the [address] [place] [address] Drive    Sincerely,    [facility]

## 2023-09-27 ENCOUNTER — OFFICE VISIT (OUTPATIENT)
Dept: SURGERY | Facility: CLINIC | Age: 23
End: 2023-09-27
Payer: COMMERCIAL

## 2023-09-27 VITALS
OXYGEN SATURATION: 99 % | HEIGHT: 71 IN | DIASTOLIC BLOOD PRESSURE: 60 MMHG | TEMPERATURE: 96.8 F | WEIGHT: 155 LBS | BODY MASS INDEX: 21.7 KG/M2 | SYSTOLIC BLOOD PRESSURE: 140 MMHG | HEART RATE: 73 BPM

## 2023-09-27 DIAGNOSIS — K80.20 CALCULUS OF GALLBLADDER WITHOUT CHOLECYSTITIS WITHOUT OBSTRUCTION: Primary | ICD-10-CM

## 2023-09-27 PROCEDURE — 99243 OFF/OP CNSLTJ NEW/EST LOW 30: CPT | Performed by: PHYSICIAN ASSISTANT

## 2023-10-04 ENCOUNTER — HOSPITAL ENCOUNTER (OUTPATIENT)
Dept: ULTRASOUND IMAGING | Facility: HOSPITAL | Age: 23
Discharge: HOME | End: 2023-10-04
Payer: COMMERCIAL

## 2023-10-04 DIAGNOSIS — K80.20 CALCULUS OF GALLBLADDER WITHOUT CHOLECYSTITIS WITHOUT OBSTRUCTION: ICD-10-CM

## 2023-10-04 PROCEDURE — 76705 ECHO EXAM OF ABDOMEN: CPT

## 2023-10-10 NOTE — PROGRESS NOTES
Assessment/Plan:    Calculus of gallbladder without cholecystitis without obstruction  The patient is a pleasant 70-year-old male with no significant past medical history presenting with signs and symptoms of chronic calculus cholecystitis for which definitive treatment by laparoscopic cholecystectomy with intraoperative cholangiogram is now indicated. The technical details laparoscopic cholecystectomy with intraoperative cholangiogram were explained as were the risks related to anesthesia, bleeding, infection, postoperative bile leak and bile duct injury necessitating additional surgical interventions. All questions answered to the satisfaction of the patient and informed consent obtained to proceed. Subjective:      Patient ID: Abilio Murdock is a 24 y.o. male. Patient is here today for a f/u to review US results and for a pre op to remove the gallbladder. Patient states he had his first gallbladder attack 2 years ago. His symptoms returned about 6 weeks ago. He had another attack that made the patient visit the ER. Patient symptoms are stabbing pain on his right upper quad that radiates to his mid upper abd. Patient denies back pain or shoulder pain. Patient also suffers from nausea out of no where. Patient denies vomiting, diarrhea/constipation, trouble eating/drinking or fevers/chills. Guicho KATZ MA        The following portions of the patient's history were reviewed and updated as appropriate: allergies, current medications, past family history, past medical history, past social history, past surgical history, and problem list.    Review of Systems   Constitutional:  Negative for chills and fever. HENT:  Negative for ear pain and sore throat. Eyes:  Negative for pain and visual disturbance. Respiratory:  Negative for cough and shortness of breath. Cardiovascular:  Negative for chest pain and palpitations. Gastrointestinal:  Negative for abdominal pain and vomiting.    Genitourinary: Negative for dysuria and hematuria. Musculoskeletal:  Negative for arthralgias and back pain. Skin:  Negative for color change and rash. Neurological:  Negative for seizures and syncope. All other systems reviewed and are negative. Objective:      /64 (BP Location: Left arm, Patient Position: Sitting, Cuff Size: Standard)   Pulse 82   Temp 98 °F (36.7 °C) (Temporal)   Resp 16   Ht 5' 11" (1.803 m)   Wt 72.6 kg (160 lb)   SpO2 98%   BMI 22.32 kg/m²          Physical Exam  Vitals and nursing note reviewed. Constitutional:       Appearance: He is well-developed. HENT:      Head: Normocephalic and atraumatic. Eyes:      Conjunctiva/sclera: Conjunctivae normal.      Pupils: Pupils are equal, round, and reactive to light. Cardiovascular:      Rate and Rhythm: Normal rate and regular rhythm. Pulmonary:      Effort: Pulmonary effort is normal.      Breath sounds: Normal breath sounds. Abdominal:      General: Bowel sounds are normal.      Palpations: Abdomen is soft. Comments: Benign abdominal examination   Musculoskeletal:         General: Normal range of motion. Cervical back: Normal range of motion and neck supple. Skin:     General: Skin is warm and dry. Neurological:      Mental Status: He is alert and oriented to person, place, and time. Psychiatric:         Behavior: Behavior normal.         Thought Content:  Thought content normal.         Judgment: Judgment normal.

## 2023-10-11 ENCOUNTER — OFFICE VISIT (OUTPATIENT)
Dept: SURGERY | Facility: CLINIC | Age: 23
End: 2023-10-11

## 2023-10-11 VITALS
HEIGHT: 71 IN | RESPIRATION RATE: 16 BRPM | BODY MASS INDEX: 22.4 KG/M2 | OXYGEN SATURATION: 98 % | TEMPERATURE: 98 F | SYSTOLIC BLOOD PRESSURE: 114 MMHG | DIASTOLIC BLOOD PRESSURE: 64 MMHG | WEIGHT: 160 LBS | HEART RATE: 82 BPM

## 2023-10-11 DIAGNOSIS — K80.20 CALCULUS OF GALLBLADDER WITHOUT CHOLECYSTITIS WITHOUT OBSTRUCTION: Primary | ICD-10-CM

## 2023-10-11 RX ORDER — SODIUM CHLORIDE, SODIUM LACTATE, POTASSIUM CHLORIDE, CALCIUM CHLORIDE 600; 310; 30; 20 MG/100ML; MG/100ML; MG/100ML; MG/100ML
125 INJECTION, SOLUTION INTRAVENOUS CONTINUOUS
OUTPATIENT
Start: 2023-10-11

## 2023-10-11 NOTE — PROGRESS NOTES
H&P Exam - General Surgery   Lewis Pastor 24 y.o. male MRN: [id number]   Encounter: [id number]    Assessment/Plan    Calculus of gallbladder without cholecystitis without obstruction  Presents with signs/symptoms of chronic calculous cholecystitis following episode of biliary colic requiring ED visit on 8/30/23. Since that episode symptoms resolving, but interested in definitve surgery. At his ED visit a point of care ultrasound performed where he was told he has gallstones. I requested he complete a formal RUQ US to confirm this diagnosis and return with Dr. Sonya Leahy to discuss surgery further. Questions answered, agreeable to plan. Diagnoses and all orders for this visit:    Calculus of gallbladder without cholecystitis without obstruction  -     US right upper quadrant; Future        History of Present Illness   Chief Complaint   Patient presents with    Consult     gallbladder     24 M here for follow-up after ER visit for biliary colic. Review of Systems   All other systems reviewed and are negative. Historical Information   No past medical history on file. No past surgical history on file. Social History   Social History     Substance and Sexual Activity   Alcohol Use No     Social History     Substance and Sexual Activity   Drug Use No     Social History     Tobacco Use   Smoking Status Never   Smokeless Tobacco Not on file     No family history on file. Meds/Allergies   No current outpatient medications on file. No Known Allergies    The following portions of the patient's history were reviewed and updated as appropriate: allergies, current medications, past family history, past medical history, past social history, past surgical history, and problem list.    Objective   Current Vitals:   Blood pressure 140/60, pulse 73, temperature (!) 96.8 °F (36 °C), temperature source Temporal, height 5' 11" (1.803 m), weight 70.3 kg (155 lb), SpO2 99 %.     Physical Exam  Constitutional: General: He is not in acute distress. Appearance: He is well-developed. He is not diaphoretic. HENT:      Head: Normocephalic and atraumatic. Eyes:      Pupils: Pupils are equal, round, and reactive to light. Pulmonary:      Effort: No respiratory distress. Abdominal:      Comments: Flat, soft, non-distended, non-tender to palpation. Musculoskeletal:         General: Normal range of motion. Cervical back: Normal range of motion. Skin:     General: Skin is warm and dry. Neurological:      Mental Status: He is alert and oriented to person, place, and time.          Signature:  Harish Peña PA-C  Date: 10/11/2023 Time: 1:20 PM

## 2023-10-11 NOTE — ASSESSMENT & PLAN NOTE
The patient is a pleasant 42-year-old male with no significant past medical history presenting with signs and symptoms of chronic calculus cholecystitis for which definitive treatment by laparoscopic cholecystectomy with intraoperative cholangiogram is now indicated. The technical details laparoscopic cholecystectomy with intraoperative cholangiogram were explained as were the risks related to anesthesia, bleeding, infection, postoperative bile leak and bile duct injury necessitating additional surgical interventions. All questions answered to the satisfaction of the patient and informed consent obtained to proceed.

## 2023-10-11 NOTE — ASSESSMENT & PLAN NOTE
Presents with signs/symptoms of chronic calculous cholecystitis following episode of biliary colic requiring ED visit on 8/30/23. Since that episode symptoms resolving, but interested in definitve surgery. At his ED visit a point of care ultrasound performed where he was told he has gallstones. I requested he complete a formal RUQ US to confirm this diagnosis and return with Dr. Saumya Rivera to discuss surgery further. Questions answered, agreeable to plan.

## 2023-10-11 NOTE — H&P
Assessment/Plan:    Calculus of gallbladder without cholecystitis without obstruction  The patient is a pleasant 25-year-old male with no significant past medical history presenting with signs and symptoms of chronic calculus cholecystitis for which definitive treatment by laparoscopic cholecystectomy with intraoperative cholangiogram is now indicated. The technical details laparoscopic cholecystectomy with intraoperative cholangiogram were explained as were the risks related to anesthesia, bleeding, infection, postoperative bile leak and bile duct injury necessitating additional surgical interventions. All questions answered to the satisfaction of the patient and informed consent obtained to proceed. Subjective:      Patient ID: Shea Rodriguez is a 24 y.o. male. Patient is here today for a f/u to review US results and for a pre op to remove the gallbladder. Patient states he had his first gallbladder attack 2 years ago. His symptoms returned about 6 weeks ago. He had another attack that made the patient visit the ER. Patient symptoms are stabbing pain on his right upper quad that radiates to his mid upper abd. Patient denies back pain or shoulder pain. Patient also suffers from nausea out of no where. Patient denies vomiting, diarrhea/constipation, trouble eating/drinking or fevers/chills. Guicho KATZ MA        The following portions of the patient's history were reviewed and updated as appropriate: allergies, current medications, past family history, past medical history, past social history, past surgical history, and problem list.    Review of Systems   Constitutional:  Negative for chills and fever. HENT:  Negative for ear pain and sore throat. Eyes:  Negative for pain and visual disturbance. Respiratory:  Negative for cough and shortness of breath. Cardiovascular:  Negative for chest pain and palpitations. Gastrointestinal:  Negative for abdominal pain and vomiting.    Genitourinary: Negative for dysuria and hematuria. Musculoskeletal:  Negative for arthralgias and back pain. Skin:  Negative for color change and rash. Neurological:  Negative for seizures and syncope. All other systems reviewed and are negative. Objective:      /64 (BP Location: Left arm, Patient Position: Sitting, Cuff Size: Standard)   Pulse 82   Temp 98 °F (36.7 °C) (Temporal)   Resp 16   Ht 5' 11" (1.803 m)   Wt 72.6 kg (160 lb)   SpO2 98%   BMI 22.32 kg/m²          Physical Exam  Vitals and nursing note reviewed. Constitutional:       Appearance: He is well-developed. HENT:      Head: Normocephalic and atraumatic. Eyes:      Conjunctiva/sclera: Conjunctivae normal.      Pupils: Pupils are equal, round, and reactive to light. Cardiovascular:      Rate and Rhythm: Normal rate and regular rhythm. Pulmonary:      Effort: Pulmonary effort is normal.      Breath sounds: Normal breath sounds. Abdominal:      General: Bowel sounds are normal.      Palpations: Abdomen is soft. Comments: Benign abdominal examination   Musculoskeletal:         General: Normal range of motion. Cervical back: Normal range of motion and neck supple. Skin:     General: Skin is warm and dry. Neurological:      Mental Status: He is alert and oriented to person, place, and time. Psychiatric:         Behavior: Behavior normal.         Thought Content:  Thought content normal.         Judgment: Judgment normal.

## 2023-10-31 ENCOUNTER — TELEPHONE (OUTPATIENT)
Dept: SURGERY | Facility: CLINIC | Age: 23
End: 2023-10-31

## 2023-11-01 ENCOUNTER — TELEPHONE (OUTPATIENT)
Dept: SURGERY | Facility: CLINIC | Age: 23
End: 2023-11-01

## 2023-11-01 NOTE — TELEPHONE ENCOUNTER
Patient called the office asking questions about his [facility] paper work. I stated that the paperwork was faxed on Tuesday 10/31/23,he said yes he got it but there is some issues and he would like to speak to [facility].

## 2023-11-10 ENCOUNTER — TELEPHONE (OUTPATIENT)
Dept: SURGERY | Facility: CLINIC | Age: 23
End: 2023-11-10

## 2023-11-10 NOTE — TELEPHONE ENCOUNTER
Patient's FMLA form was updated and re faxed to [facility]

## 2023-11-10 NOTE — PRE-PROCEDURE INSTRUCTIONS
No outpatient medications have been marked as taking for the [facility] Medication instructions for day surgery reviewed. Please use only a sip of water to take your instructed medications. Avoid all over the counter vitamins, supplements and NSAIDS for one week prior to surgery per anesthesia guidelines. Tylenol is ok to take as needed. You will receive a call one business day prior to surgery with an arrival time and hospital directions. If your surgery is scheduled on a Monday, the hospital will be calling you on the Friday prior to your surgery. If you have not heard from anyone by 8pm, please call the hospital supervisor through the hospital  at [phone]. Jasmin Morales [phone]). Do not eat or drink anything after midnight the night before your surgery, including candy, mints, lifesavers, or chewing gum. Do not drink alcohol 24hrs before your surgery. Try not to smoke at least 24hrs before your surgery. Follow the pre surgery showering instructions as listed in the [facility] Surgical Experience Booklet” or otherwise provided by your surgeon's office. Do not use a blade to shave the surgical area 1 week before surgery. It is okay to use a clean electric clippers up to 24 hours before surgery. Do not apply any lotions, creams, including makeup, cologne, deodorant, or perfumes after showering on the day of your surgery. Do not use dry shampoo, hair spray, hair gel, or any type of hair products. No contact lenses, eye make-up, or artificial eyelashes. Remove nail polish, including gel polish, and any artificial, gel, or acrylic nails if possible. Remove all jewelry including rings and body piercing jewelry. Wear causal clothing that is easy to take on and off. Consider your type of surgery. Keep any valuables, jewelry, piercings at home. Please bring any specially ordered equipment (sling, braces) if indicated.     Arrange for a responsible person to drive you to and from the hospital on the day of your surgery. Visitor Guidelines discussed. Call the surgeon's office with any new illnesses, exposures, or additional questions prior to surgery. Please reference your [facility] Surgical Experience Booklet” for additional information to prepare for your upcoming surgery.

## 2023-11-13 ENCOUNTER — ANESTHESIA EVENT (OUTPATIENT)
Dept: PERIOP | Facility: HOSPITAL | Age: 23
End: 2023-11-13
Payer: COMMERCIAL

## 2023-11-14 ENCOUNTER — APPOINTMENT (OUTPATIENT)
Dept: RADIOLOGY | Facility: HOSPITAL | Age: 23
End: 2023-11-14
Payer: COMMERCIAL

## 2023-11-14 ENCOUNTER — ANESTHESIA (OUTPATIENT)
Dept: PERIOP | Facility: HOSPITAL | Age: 23
End: 2023-11-14
Payer: COMMERCIAL

## 2023-11-14 ENCOUNTER — HOSPITAL ENCOUNTER (OUTPATIENT)
Facility: HOSPITAL | Age: 23
Setting detail: OUTPATIENT SURGERY
Discharge: HOME | End: 2023-11-14
Attending: SURGERY | Admitting: SURGERY
Payer: COMMERCIAL

## 2023-11-14 ENCOUNTER — TELEPHONE (OUTPATIENT)
Dept: SURGERY | Facility: CLINIC | Age: 23
End: 2023-11-14

## 2023-11-14 VITALS
BODY MASS INDEX: 22.4 KG/M2 | SYSTOLIC BLOOD PRESSURE: 106 MMHG | WEIGHT: 160 LBS | DIASTOLIC BLOOD PRESSURE: 65 MMHG | RESPIRATION RATE: 18 BRPM | TEMPERATURE: 98.2 F | OXYGEN SATURATION: 99 % | HEART RATE: 59 BPM | HEIGHT: 71 IN

## 2023-11-14 DIAGNOSIS — K80.20 CALCULUS OF GALLBLADDER WITHOUT CHOLECYSTITIS WITHOUT OBSTRUCTION: ICD-10-CM

## 2023-11-14 PROCEDURE — 88304 TISSUE EXAM BY PATHOLOGIST: CPT | Performed by: PATHOLOGY

## 2023-11-14 PROCEDURE — 74300 X-RAY BILE DUCTS/PANCREAS: CPT

## 2023-11-14 PROCEDURE — 47563 LAPARO CHOLECYSTECTOMY/GRAPH: CPT | Performed by: PHYSICIAN ASSISTANT

## 2023-11-14 PROCEDURE — 47562 LAPAROSCOPIC CHOLECYSTECTOMY: CPT | Performed by: PHYSICIAN ASSISTANT

## 2023-11-14 PROCEDURE — NC001 PR NO CHARGE: Performed by: PHYSICIAN ASSISTANT

## 2023-11-14 RX ORDER — PROMETHAZINE HYDROCHLORIDE 25 MG/ML
12.5 INJECTION, SOLUTION INTRAMUSCULAR; INTRAVENOUS ONCE AS NEEDED
Status: DISCONTINUED | OUTPATIENT
Start: 2023-11-14 | End: 2023-11-14 | Stop reason: HOSPADM

## 2023-11-14 RX ORDER — NEOSTIGMINE METHYLSULFATE 1 MG/ML
INJECTION INTRAVENOUS AS NEEDED
Status: DISCONTINUED | OUTPATIENT
Start: 2023-11-14 | End: 2023-11-14

## 2023-11-14 RX ORDER — ONDANSETRON 2 MG/ML
INJECTION INTRAMUSCULAR; INTRAVENOUS AS NEEDED
Status: DISCONTINUED | OUTPATIENT
Start: 2023-11-14 | End: 2023-11-14

## 2023-11-14 RX ORDER — HYDROCODONE BITARTRATE AND ACETAMINOPHEN 5; 325 MG/1; MG/1
1 TABLET ORAL EVERY 6 HOURS PRN
Status: DISCONTINUED | OUTPATIENT
Start: 2023-11-14 | End: 2023-11-14 | Stop reason: HOSPADM

## 2023-11-14 RX ORDER — DIPHENHYDRAMINE HYDROCHLORIDE 50 MG/ML
12.5 INJECTION INTRAMUSCULAR; INTRAVENOUS ONCE AS NEEDED
Status: DISCONTINUED | OUTPATIENT
Start: 2023-11-14 | End: 2023-11-14 | Stop reason: HOSPADM

## 2023-11-14 RX ORDER — MIDAZOLAM HYDROCHLORIDE 2 MG/2ML
INJECTION, SOLUTION INTRAMUSCULAR; INTRAVENOUS AS NEEDED
Status: DISCONTINUED | OUTPATIENT
Start: 2023-11-14 | End: 2023-11-14

## 2023-11-14 RX ORDER — FENTANYL CITRATE 50 UG/ML
INJECTION, SOLUTION INTRAMUSCULAR; INTRAVENOUS AS NEEDED
Status: DISCONTINUED | OUTPATIENT
Start: 2023-11-14 | End: 2023-11-14

## 2023-11-14 RX ORDER — ROCURONIUM BROMIDE 10 MG/ML
INJECTION, SOLUTION INTRAVENOUS AS NEEDED
Status: DISCONTINUED | OUTPATIENT
Start: 2023-11-14 | End: 2023-11-14

## 2023-11-14 RX ORDER — ACETAMINOPHEN 325 MG/1
650 TABLET ORAL EVERY 6 HOURS PRN
Status: DISCONTINUED | OUTPATIENT
Start: 2023-11-14 | End: 2023-11-14 | Stop reason: HOSPADM

## 2023-11-14 RX ORDER — BUPIVACAINE HYDROCHLORIDE 5 MG/ML
INJECTION, SOLUTION EPIDURAL; INTRACAUDAL AS NEEDED
Status: DISCONTINUED | OUTPATIENT
Start: 2023-11-14 | End: 2023-11-14 | Stop reason: HOSPADM

## 2023-11-14 RX ORDER — HYDROMORPHONE HCL/PF 1 MG/ML
0.2 SYRINGE (ML) INJECTION
Status: DISCONTINUED | OUTPATIENT
Start: 2023-11-14 | End: 2023-11-14 | Stop reason: HOSPADM

## 2023-11-14 RX ORDER — HYDROCODONE BITARTRATE AND ACETAMINOPHEN 5; 325 MG/1; MG/1
2 TABLET ORAL EVERY 6 HOURS PRN
Status: DISCONTINUED | OUTPATIENT
Start: 2023-11-14 | End: 2023-11-14 | Stop reason: HOSPADM

## 2023-11-14 RX ORDER — MAGNESIUM HYDROXIDE 1200 MG/15ML
LIQUID ORAL AS NEEDED
Status: DISCONTINUED | OUTPATIENT
Start: 2023-11-14 | End: 2023-11-14 | Stop reason: HOSPADM

## 2023-11-14 RX ORDER — SODIUM CHLORIDE, SODIUM LACTATE, POTASSIUM CHLORIDE, CALCIUM CHLORIDE 600; 310; 30; 20 MG/100ML; MG/100ML; MG/100ML; MG/100ML
75 INJECTION, SOLUTION INTRAVENOUS CONTINUOUS
Status: DISCONTINUED | OUTPATIENT
Start: 2023-11-14 | End: 2023-11-14 | Stop reason: HOSPADM

## 2023-11-14 RX ORDER — GLYCOPYRROLATE 0.2 MG/ML
INJECTION INTRAMUSCULAR; INTRAVENOUS AS NEEDED
Status: DISCONTINUED | OUTPATIENT
Start: 2023-11-14 | End: 2023-11-14

## 2023-11-14 RX ORDER — SODIUM CHLORIDE, SODIUM LACTATE, POTASSIUM CHLORIDE, CALCIUM CHLORIDE 600; 310; 30; 20 MG/100ML; MG/100ML; MG/100ML; MG/100ML
125 INJECTION, SOLUTION INTRAVENOUS CONTINUOUS
Status: DISCONTINUED | OUTPATIENT
Start: 2023-11-14 | End: 2023-11-14 | Stop reason: HOSPADM

## 2023-11-14 RX ORDER — LIDOCAINE HYDROCHLORIDE 10 MG/ML
INJECTION, SOLUTION EPIDURAL; INFILTRATION; INTRACAUDAL; PERINEURAL AS NEEDED
Status: DISCONTINUED | OUTPATIENT
Start: 2023-11-14 | End: 2023-11-14

## 2023-11-14 RX ORDER — PROPOFOL 10 MG/ML
INJECTION, EMULSION INTRAVENOUS AS NEEDED
Status: DISCONTINUED | OUTPATIENT
Start: 2023-11-14 | End: 2023-11-14

## 2023-11-14 RX ORDER — DEXMEDETOMIDINE HYDROCHLORIDE 100 UG/ML
INJECTION, SOLUTION INTRAVENOUS AS NEEDED
Status: DISCONTINUED | OUTPATIENT
Start: 2023-11-14 | End: 2023-11-14

## 2023-11-14 RX ORDER — DEXAMETHASONE SODIUM PHOSPHATE 10 MG/ML
INJECTION, SOLUTION INTRAMUSCULAR; INTRAVENOUS AS NEEDED
Status: DISCONTINUED | OUTPATIENT
Start: 2023-11-14 | End: 2023-11-14

## 2023-11-14 RX ORDER — OXYCODONE HYDROCHLORIDE 5 MG/1
5 TABLET ORAL EVERY 6 HOURS PRN
Qty: 15 TABLET | Refills: 0 | Status: SHIPPED | OUTPATIENT
Start: 2023-11-14 | End: 2023-11-24

## 2023-11-14 RX ORDER — CEFAZOLIN SODIUM 1 G/50ML
1000 SOLUTION INTRAVENOUS ONCE
Status: COMPLETED | OUTPATIENT
Start: 2023-11-14 | End: 2023-11-14

## 2023-11-14 RX ADMIN — SODIUM CHLORIDE, SODIUM LACTATE, POTASSIUM CHLORIDE, AND CALCIUM CHLORIDE 125 ML/HR: .6; .31; .03; .02 INJECTION, SOLUTION INTRAVENOUS at 07:13

## 2023-11-14 RX ADMIN — DEXMEDETOMIDINE HCL 8 MCG: 100 INJECTION INTRAVENOUS at 08:51

## 2023-11-14 RX ADMIN — FENTANYL CITRATE 50 MCG: 50 INJECTION INTRAMUSCULAR; INTRAVENOUS at 07:42

## 2023-11-14 RX ADMIN — LIDOCAINE HYDROCHLORIDE 50 MG: 10 INJECTION, SOLUTION EPIDURAL; INFILTRATION; INTRACAUDAL; PERINEURAL at 07:42

## 2023-11-14 RX ADMIN — FENTANYL CITRATE 50 MCG: 50 INJECTION INTRAMUSCULAR; INTRAVENOUS at 08:35

## 2023-11-14 RX ADMIN — DEXMEDETOMIDINE HCL 8 MCG: 100 INJECTION INTRAVENOUS at 08:10

## 2023-11-14 RX ADMIN — HYDROMORPHONE HYDROCHLORIDE 0.2 MG: 1 INJECTION, SOLUTION INTRAMUSCULAR; INTRAVENOUS; SUBCUTANEOUS at 09:24

## 2023-11-14 RX ADMIN — DEXAMETHASONE SODIUM PHOSPHATE 10 MG: 10 INJECTION, SOLUTION INTRAMUSCULAR; INTRAVENOUS at 07:46

## 2023-11-14 RX ADMIN — MIDAZOLAM 2 MG: 1 INJECTION INTRAMUSCULAR; INTRAVENOUS at 07:39

## 2023-11-14 RX ADMIN — GLYCOPYRROLATE 0.6 MG: 0.2 INJECTION, SOLUTION INTRAMUSCULAR; INTRAVENOUS at 08:38

## 2023-11-14 RX ADMIN — HYDROMORPHONE HYDROCHLORIDE 0.2 MG: 1 INJECTION, SOLUTION INTRAMUSCULAR; INTRAVENOUS; SUBCUTANEOUS at 09:19

## 2023-11-14 RX ADMIN — ONDANSETRON 4 MG: 2 INJECTION INTRAMUSCULAR; INTRAVENOUS at 08:30

## 2023-11-14 RX ADMIN — HYDROCODONE BITARTRATE AND ACETAMINOPHEN 1 TABLET: 5; 325 TABLET ORAL at 10:35

## 2023-11-14 RX ADMIN — CEFAZOLIN SODIUM 1000 MG: 1 SOLUTION INTRAVENOUS at 07:30

## 2023-11-14 RX ADMIN — NEOSTIGMINE METHYLSULFATE 4 MG: 1 INJECTION INTRAVENOUS at 08:38

## 2023-11-14 RX ADMIN — ROCURONIUM BROMIDE 50 MG: 10 INJECTION, SOLUTION INTRAVENOUS at 07:43

## 2023-11-14 RX ADMIN — SODIUM CHLORIDE, SODIUM LACTATE, POTASSIUM CHLORIDE, AND CALCIUM CHLORIDE: .6; .31; .03; .02 INJECTION, SOLUTION INTRAVENOUS at 08:01

## 2023-11-14 RX ADMIN — SODIUM CHLORIDE, SODIUM LACTATE, POTASSIUM CHLORIDE, AND CALCIUM CHLORIDE: .6; .31; .03; .02 INJECTION, SOLUTION INTRAVENOUS at 07:05

## 2023-11-14 RX ADMIN — DEXMEDETOMIDINE HCL 4 MCG: 100 INJECTION INTRAVENOUS at 07:48

## 2023-11-14 RX ADMIN — ACETAMINOPHEN 650 MG: 325 TABLET ORAL at 10:01

## 2023-11-14 RX ADMIN — HYDROMORPHONE HYDROCHLORIDE 0.2 MG: 1 INJECTION, SOLUTION INTRAMUSCULAR; INTRAVENOUS; SUBCUTANEOUS at 09:32

## 2023-11-14 RX ADMIN — PROPOFOL 200 MG: 10 INJECTION, EMULSION INTRAVENOUS at 07:42

## 2023-11-14 RX ADMIN — DEXMEDETOMIDINE HCL 8 MCG: 100 INJECTION INTRAVENOUS at 07:42

## 2023-11-14 NOTE — H&P
H&P Exam - General Surgery   Ulisses Biggs 24 y.o. male MRN: [id number]  Unit/Bed#: OR [facility] Encounter: [id number]    Assessment/Plan     Assessment:  Patient presents for definitive treatment of his chronic calculus cholecystitis by laparoscopic cholecystectomy with intraoperative cholangiogram.    Plan:  The technical details laparoscopic cholecystectomy with intraoperative cholangiogram were explained as were the risks related to anesthesia, bleeding, infection, postoperative bile leak and bile duct injury necessitating additional surgical interventions. All questions answered to the satisfaction of the patient and informed consent obtained to proceed. History of Present Illness . HPI:  Ulisses Biggs is a 24 y.o. male who presents with signs and symptoms of chronic calculus cholecystitis. Review of Systems   Constitutional:  Negative for chills and fever. HENT:  Negative for ear pain and sore throat. Eyes:  Negative for pain and visual disturbance. Respiratory:  Negative for cough and shortness of breath. Cardiovascular:  Negative for chest pain and palpitations. Gastrointestinal:  Negative for abdominal pain and vomiting. Genitourinary:  Negative for dysuria and hematuria. Musculoskeletal:  Negative for arthralgias and back pain. Skin:  Negative for color change and rash. Neurological:  Negative for seizures and syncope. All other systems reviewed and are negative. Historical Information   History reviewed. No pertinent past medical history.   Past Surgical History:   Procedure Laterality Date    NO PAST SURGERIES       Social History   Social History     Substance and Sexual Activity   Alcohol Use No     Social History     Substance and Sexual Activity   Drug Use No     Social History     Tobacco Use   Smoking Status Never   Smokeless Tobacco Never     E-Cigarette/Vaping    E-Cigarette Use Never User      E-Cigarette/Vaping Substances    Nicotine No     THC No     CBD No Flavoring No     Other No     Unknown No      Family History: non-contributory    Meds/Allergies   all medications and allergies reviewed  No Known Allergies    Objective   First Vitals:   Blood Pressure: 110/64 (11/14/23 0648)  Pulse: 63 (11/14/23 0648)  Temperature: (!) 97.3 °F (36.3 °C) (11/14/23 0648)  Temp Source: Temporal (11/14/23 0648)  Respirations: 18 (11/14/23 0648)  Height: 5' 11" (180.3 cm) (11/14/23 2898)  Weight - Scale: 72.6 kg (160 lb) (11/14/23 0648)  SpO2: 100 % (11/14/23 0648)    Current Vitals:   Blood Pressure: 110/64 (11/14/23 0648)  Pulse: 63 (11/14/23 0648)  Temperature: (!) 97.3 °F (36.3 °C) (11/14/23 0648)  Temp Source: Temporal (11/14/23 0648)  Respirations: 18 (11/14/23 0648)  Height: 5' 11" (180.3 cm) (11/14/23 0193)  Weight - Scale: 72.6 kg (160 lb) (11/14/23 0648)  SpO2: 100 % (11/14/23 0648)    No intake or output data in the 24 hours ending 11/14/23 0729    Invasive Devices       Peripheral Intravenous Line  Duration             Peripheral IV 11/14/23 Dorsal (posterior); Right Hand <1 day                    Physical Exam  Vitals and nursing note reviewed. Constitutional:       General: He is not in acute distress. Appearance: He is well-developed. HENT:      Head: Normocephalic and atraumatic. Eyes:      Conjunctiva/sclera: Conjunctivae normal.   Cardiovascular:      Rate and Rhythm: Normal rate and regular rhythm. Heart sounds: No murmur heard. Pulmonary:      Effort: Pulmonary effort is normal. No respiratory distress. Breath sounds: Normal breath sounds. Abdominal:      Palpations: Abdomen is soft. Tenderness: There is no abdominal tenderness. Musculoskeletal:         General: No swelling. Cervical back: Neck supple. Skin:     General: Skin is warm and dry. Capillary Refill: Capillary refill takes less than 2 seconds. Neurological:      Mental Status: He is alert.    Psychiatric:         Mood and Affect: Mood normal.         Lab Results: I have personally reviewed pertinent lab results. Imaging: I have personally reviewed pertinent reports. EKG, Pathology, and Other Studies: I have personally reviewed pertinent reports. Code Status: No Order  Advance Directive and Living Will:      Power of :    POLST:      Counseling / Coordination of Care  Total floor / unit time spent today 35 minutes. Greater than 50% of total time was spent with the patient and / or family counseling and / or coordination of care. A description of the counseling / coordination of care: 15.

## 2023-11-14 NOTE — TELEPHONE ENCOUNTER
Spoke with Jaymie with [employer] at [phone] and gave her a all the clinical information on MrIftikhar Mason for an approval for his oxycodone. Authorization was obtained with an approval number [id number] valid from 11- to 12-. I also called [facility] in [address] [facility] to make them aware.

## 2023-11-14 NOTE — DISCHARGE INSTR - AVS FIRST PAGE
[facility]    Discharge Instructions  Light activity for 2 weeks. No heavy lifting for 2 weeks. Max 10 lbs for 2 weeks. No driving for 3-7 days or until pain is well controlled. Surgical glue will fall off with time. You may shower starting tomorrow. Take discharge medications as prescribed. Notify our office for nausea, vomiting, fever, diarrhea, chest pain, trouble breathing. Follow up in our office in 2 weeks or sooner if needed. Call with additional questions or concerns [phone]. For pain you may take ibuprofen 600 mg every 6 hours scheduled for 3 days then as needed. You can also take acetaminophen 650 mg every 6 hours scheduled for 3 days then as needed. For ongoing pain you can alternate ibuprofen and acetaminophen every 3 hours. If pain not controlled with this regimen you can use Oxycodone as prescribed. Ice packs may be helpful, 20 min on, 20 min off alternating and monitoring skin.

## 2023-11-14 NOTE — OP NOTE
OPERATIVE REPORT  PATIENT NAME: Kip Lawrence    :  2000  MRN: [id number]  Pt Location: [facility] OR ROOM 01    SURGERY DATE: 2023    Surgeon(s) and Role:     Griselda Revere, MD - Primary     * Harish Peña PA-C  The PA was necessary to provide expert assistance; i.e. in the form of providing optimal exposure with retraction, suturing, and assistance with dissection in order to perform the most efficient operation and in order to optimize patient safety in the abscence of a qualified surgical resident. Preop Diagnosis:  Calculus of gallbladder without cholecystitis without obstruction [K80.20]    Post-Op Diagnosis Codes:     * Calculus of gallbladder with other cholecystitis, without mention of obstruction [K80.10]    Procedure(s):  CHOLECYSTECTOMY LAPAROSCOPIC W/ INTRAOP CHOLANGIOGRAM    Specimen(s):  ID Type Source Tests Collected by Time Destination   1 : GALLBLADDER Tissue Gallbladder TISSUE Ginny Graves MD 2023 [id number]        Estimated Blood Loss:   Minimal    Drains:  * No LDAs found *    Anesthesia Type:   General    Operative Indications:  Calculus of gallbladder without cholecystitis without obstruction [K80.20]  Patient is a pleasant 70-year-old male presenting with signs and symptoms of chronic calculus cholecystitis for which definitive treatment by laparoscopic cholecystectomy is now indicated. Operative Findings: At the time of the procedure, 4 quadrants of the abdomen were inspected laparoscopically. The only pathology of note was a chronically inflamed gallbladder. During the course of the dissection the critical structures identified. Cystic duct dissected out circumferentially. Cystic artery dissected out circumferentially. An intraoperative cholangiogram confirmed the proper anatomy. There is free flow of contrast antegrade and retrograde in the common bile duct. No evidence of choledocholithiasis.   There is free flow of contrast into the duodenal sweep. The cholecystectomy completed laparoscopically. The patient tolerated the procedure well. Complications:   None    Procedure and Technique:  The patient was taken to the operating room where they were properly identified, monitored and anesthetized. They received antibiotics perioperatively. Venodyne's were placed prior to the induction of anesthesia for DVT prophylaxis. The abdomen prepped and draped under sterile conditions using aseptic technique. Timeout performed. Skin incised in the right upper quadrant. . Peritoneal cavity entered bluntly with a 5 mm trocar. Pneumoperitoneum established to 15 mmHg. 4 quadrants of the abdomen and inspected laparoscopically and no additional pathology was identified. 3 additional working ports placed. These were an 11 mm port in the epigastrium and 2 additional 5 mm ports in the right upper quadrant. The patient placed in reverse Trendelenburg, left side down. Gallbladder grasped at its dome retracted cephalad and to the right. Infundibulum grasped and retracted laterally. [place] defined and skeletonized. Cystic duct dissected out circumferentially. Cystic artery dissected out circumferentially. Cystic artery clipped below and divided above with harmonic ronaldo. A clip placed on the up side of the cystic duct. Operative cholangiogram performed with the above findings noted. Cystic duct clipped twice on the down side and divided between clips. Gallbladder dissected off the liver with harmonic ronaldo. The gallbladder was placed in an Endobag and delivered through the epigastric trocar site. Pneumoperitoneum reestablished to 15 mmHg. Scope advanced and the right upper quadrant inspected. Good hemostasis found. The fascial defect at the epigastrium closed with an 0 Vicryl. Ports removed. Skin closed with subcuticular 4-0 Monocryl suture. Wounds infiltrated with half percent Marcaine. The wounds dressed.  The patient extubated and taken to recovery in stable condition. I was present for the entire procedure.     Patient Disposition:  PACU         SIGNATURE: Bird Dominguez MD  DATE: November 14, 2023  TIME: 8:40 AM

## 2023-11-14 NOTE — ANESTHESIA POSTPROCEDURE EVALUATION
Post-Op Assessment Note    CV Status:  Stable  Pain Score: 0    Pain management: adequate       Mental Status:  Alert and awake   Hydration Status:  Euvolemic   PONV Controlled:  Controlled   Airway Patency:  Patent     Post Op Vitals Reviewed: Yes    No anethesia notable event occurred.     Staff: CRNA               BP   89/51   Temp  98   Pulse  56   Resp   16   SpO2   99

## 2023-11-14 NOTE — ANESTHESIA PREPROCEDURE EVALUATION
Procedure:  CHOLECYSTECTOMY LAPAROSCOPIC W/ INTRAOP CHOLANGIOGRAM (Abdomen)    Relevant Problems   No relevant active problems        Physical Exam    Airway    Mallampati score: II  TM Distance: >3 FB  Neck ROM: full     Dental   No notable dental hx     Cardiovascular  Cardiovascular exam normal    Pulmonary  Pulmonary exam normal     Other Findings        Anesthesia Plan  ASA Score- 1     Anesthesia Type- general with ASA Monitors. Additional Monitors:     Airway Plan: ETT. Plan Factors-Exercise tolerance (METS): >4 METS. Chart reviewed. EKG reviewed. Existing labs reviewed. Patient summary reviewed. Patient is not a current smoker. Induction- intravenous. Postoperative Plan- Plan for postoperative opioid use. Informed Consent- Anesthetic plan and risks discussed with patient. I personally reviewed this patient with the CRNA. Discussed and agreed on the Anesthesia Plan with the CRNA. Vero Dooley

## 2023-11-15 ENCOUNTER — TELEPHONE (OUTPATIENT)
Dept: SURGERY | Facility: CLINIC | Age: 23
End: 2023-11-15

## 2023-11-15 ENCOUNTER — HOSPITAL ENCOUNTER (EMERGENCY)
Facility: HOSPITAL | Age: 23
Discharge: HOME | End: 2023-11-15
Attending: EMERGENCY MEDICINE
Payer: COMMERCIAL

## 2023-11-15 VITALS
TEMPERATURE: 98.6 F | SYSTOLIC BLOOD PRESSURE: 142 MMHG | HEART RATE: 60 BPM | OXYGEN SATURATION: 100 % | RESPIRATION RATE: 18 BRPM | DIASTOLIC BLOOD PRESSURE: 80 MMHG

## 2023-11-15 DIAGNOSIS — J02.9 SORE THROAT: Primary | ICD-10-CM

## 2023-11-15 DIAGNOSIS — G89.18 POST-OP PAIN: ICD-10-CM

## 2023-11-15 PROCEDURE — 99283 EMERGENCY DEPT VISIT LOW MDM: CPT

## 2023-11-15 PROCEDURE — 99284 EMERGENCY DEPT VISIT MOD MDM: CPT | Performed by: PHYSICIAN ASSISTANT

## 2023-11-15 NOTE — TELEPHONE ENCOUNTER
Reggie's mother Jose D Mills called (P: [phone]) stating Reggie's throat is bright red and so is his tonsils. She also states there might be a lesion on his tonsil. Patient had his gallbladder removed yesterday. Patient doesn't have a pcp.

## 2023-11-15 NOTE — ED PROVIDER NOTES
History  Chief Complaint   Patient presents with    Post-op Problem     Mom reports patient had his gallbladder removed yesterday. Today now has a red and sore throat, and a possible lesion on his tonsil. Patient also complains of some extremity numbness. 24year old male with no significant reported PMH presenting with mom for evaluation. He had his gallbladder removed yesterday via laparoscopic procedure by Dr. Antonio Ozuna in setting of gallstones and biliary colic. Pt notes immediately following his surgery he woke up with a sore throat. Symptoms persisted today. Mom notes his throat appears reddened and also noted a sore area in his throat. He reports pain which is generalized. Hurts with swallowing but has been able to tolerate liquids and soft diet. Denies fever, chills. Denies runny nose, congestion, post nasal drip, cough. Denies chest pain, SOB, N/V/D. He notes some pain in his abdomen but feels this is from his surgery/incision. It is improved with tylenol, ibuprofen. Had a dose of oxycodone last night before bed. No rashes reported. He also notes he had about a 15 minute period of numbness/tingling in his left upper arm which has since resolved. Denies headache, dizziness, visual disturbance. No difficultly with speech. No focal weakness, no trouble ambulating. Greatest concern was the sore throat. Does not have current PCP. Scheduled to see surgeon after Thanksgiving. History provided by:  Patient, medical records and parent   used: No        Prior to Admission Medications   Prescriptions Last Dose Informant Patient Reported? Taking?   oxyCODONE (Roxicodone) 5 immediate release tablet 11/14/2023  No Yes   Sig: Take 1 tablet (5 mg total) by mouth every 6 (six) hours as needed for severe pain for up to 10 days Max Daily Amount: 20 mg      Facility-Administered Medications: None       History reviewed. No pertinent past medical history.     Past Surgical History:   Procedure Laterality Date    NO PAST SURGERIES      KY LAPS SURG CHOLECYSTECTOMY W/CHOLANGIOGRAPHY N/A 11/14/2023    Procedure: CHOLECYSTECTOMY LAPAROSCOPIC W/ INTRAOP CHOLANGIOGRAM;  Surgeon: Winston Hannah MD;  Location: [facility];  Service: General       History reviewed. No pertinent family history. I have reviewed and agree with the history as documented. E-Cigarette/Vaping    E-Cigarette Use Never User      E-Cigarette/Vaping Substances    Nicotine No     THC No     CBD No     Flavoring No     Other No     Unknown No      Social History     Tobacco Use    Smoking status: Never    Smokeless tobacco: Never   Vaping Use    Vaping Use: Never used   Substance Use Topics    Alcohol use: No    Drug use: No       Review of Systems   Constitutional:  Positive for fatigue. Negative for chills and fever. HENT:  Positive for mouth sores and sore throat. Negative for congestion, drooling, facial swelling, rhinorrhea, trouble swallowing and voice change. Eyes: Negative. Negative for visual disturbance. Respiratory: Negative. Negative for cough, shortness of breath and wheezing. Cardiovascular: Negative. Negative for chest pain, palpitations and leg swelling. Gastrointestinal:  Positive for abdominal pain. Negative for constipation, diarrhea, nausea and vomiting. Genitourinary: Negative. Negative for dysuria, flank pain and frequency. Musculoskeletal: Negative. Negative for back pain and neck pain. Skin: Negative. Negative for rash. Neurological: Negative. Negative for dizziness, syncope, weakness, light-headedness and headaches. Psychiatric/Behavioral: Negative. Negative for confusion. All other systems reviewed and are negative. Physical Exam  Physical Exam  Vitals and nursing note reviewed. Constitutional:       General: He is awake. He is not in acute distress. Appearance: Normal appearance. He is well-developed.  He is not ill-appearing, toxic-appearing or diaphoretic. HENT:      Head: Normocephalic and atraumatic. Right Ear: Hearing, tympanic membrane, ear canal and external ear normal.      Left Ear: Hearing, tympanic membrane, ear canal and external ear normal.      Nose: Nose normal.      Mouth/Throat:      Lips: Pink. No lesions. Mouth: Mucous membranes are moist.      Tongue: Tongue does not deviate from midline. Pharynx: Oropharynx is clear. Uvula midline. Posterior oropharyngeal erythema present. No pharyngeal swelling or oropharyngeal exudate. Tonsils: No tonsillar abscesses. Comments: Appears to have a small aphthous ulcer on right lateral aspect of the uvula. Generalized erythema of the throat, no swelling, no exudate. No trismus. No drooling. Normal phonation. Eyes:      General: Lids are normal. No scleral icterus. Extraocular Movements: Extraocular movements intact. Conjunctiva/sclera: Conjunctivae normal.      Pupils: Pupils are equal, round, and reactive to light. Neck:      Trachea: Trachea and phonation normal.   Cardiovascular:      Rate and Rhythm: Normal rate and regular rhythm. Pulses: Normal pulses. Radial pulses are 2+ on the right side and 2+ on the left side. Heart sounds: Normal heart sounds, S1 normal and S2 normal. No murmur heard. Pulmonary:      Effort: Pulmonary effort is normal. No tachypnea or respiratory distress. Breath sounds: Normal breath sounds. No wheezing, rhonchi or rales. Abdominal:      General: Bowel sounds are normal. There is no distension. Palpations: Abdomen is soft. Tenderness: There is no abdominal tenderness. There is no guarding. Comments: Surgical incisions of laparoscopic procedure noted on abdomen. Clean, dry, intact without signs of secondary infection. Musculoskeletal:         General: Normal range of motion. Left elbow: Normal.      Cervical back: Normal range of motion and neck supple. Normal range of motion. Right lower leg: No edema. Left lower leg: No edema. Skin:     General: Skin is warm and dry. Capillary Refill: Capillary refill takes less than 2 seconds. Findings: No rash. Neurological:      General: No focal deficit present. Mental Status: He is alert and oriented to person, place, and time. GCS: GCS eye subscore is 4. GCS verbal subscore is 5. GCS motor subscore is 6. Cranial Nerves: Cranial nerves 2-12 are intact. Sensory: Sensation is intact. Motor: Motor function is intact. Gait: Gait normal.   Psychiatric:         Mood and Affect: Mood normal.         Speech: Speech normal.         Behavior: Behavior normal. Behavior is cooperative. Vital Signs  ED Triage Vitals [11/15/23 0935]   Temperature Pulse Respirations Blood Pressure SpO2   98.6 °F (37 °C) 60 18 142/80 100 %      Temp Source Heart Rate Source Patient Position - Orthostatic VS BP Location FiO2 (%)   Temporal Monitor Sitting Right arm --      Pain Score       --           Vitals:    11/15/23 0935   BP: 142/80   Pulse: 60   Patient Position - Orthostatic VS: Sitting         Visual Acuity      ED Medications  Medications - No data to display    Diagnostic Studies  Results Reviewed       None                   No orders to display              Procedures  Procedures         ED Course  ED Course as of 11/15/23 1014   Wed Nov 15, 2023   5990 Reviewed records. S/p laparoscopic cholecystectomy yesterday, Dr. Tatum Schmid. SBIRT 22yo+      Flowsheet Row Most Recent Value   Initial Alcohol Screen: US AUDIT-C     1. How often do you have a drink containing alcohol? 0 Filed at: 11/15/2023 0934   2. How many drinks containing alcohol do you have on a typical day you are drinking? 0 Filed at: 11/15/2023 0934   3a. Male UNDER 65: How often do you have five or more drinks on one occasion? 0 Filed at: 11/15/2023 0934   3b. FEMALE Any Age, or MALE 65+:  How often do you have 4 or more drinks on one occassion? 0 Filed at: 11/15/2023 0934   Audit-C Score 0 Filed at: 11/15/2023 9967   CRISTAL: How many times in the past year have you. .. Used an illegal drug or used a prescription medication for non-medical reasons? Never Filed at: 11/15/2023 2283                      Medical Decision Making  25 yo male presenting for evaluation of sore throat following recent surgery. This is likely from tube utilized during surgery/anesthesia. There appears to be a small ulceration which likely aphthous ulcer. Clinically do not suspect strep. There is no peritonsillar abscess. Doubt infectious etiology. Discussed symptomatic management. Pt was offered dose of viscous lidocaine which he declined. He is tolerating PO. He is afebrile otherwise well appearing. In regards to paresthesia, this was self limiting and has not recurred. He is not having any other symptoms to suggest stroke. Non focal exam.  Very well could have been a peripheral nerve palsy/positional in nature. From an abdomen perspective, appears to be doing well from a post op perspective. His pain has been controlled with current regimen. I clinically do not suspect any surgical related complications. Will discharge with symptomatic management and outpatient follow up. Strict return precautions outlined. Pt and mom voiced understanding. All questions answered. Advised to follow up with general surgery as scheduled or return to ER as needed. Please refer to above ER course for further details/discussion. Problems Addressed:  Post-op pain: acute illness or injury  Sore throat: acute illness or injury    Amount and/or Complexity of Data Reviewed  Independent Historian: parent  External Data Reviewed: notes. Risk  OTC drugs. Prescription drug management.              Disposition  Final diagnoses:   Sore throat   Post-op pain     Time reflects when diagnosis was documented in both MDM as applicable and the Disposition within this note       Time User Action Codes Description Comment    11/15/2023  9:58 AM Lucina Chaparro Add [J02.9] Sore throat     11/15/2023  9:58 AM Lucina Chaparro Add [G89.18] Post-op pain           ED Disposition       ED Disposition   Discharge    Condition   Stable    Date/Time   Wed Nov 15, 2023 4310 [facility] discharge to home/self care. Follow-up Information       Follow up With Specialties Details Why Contact Info Additional Information    [address] Emergency Department Emergency Medicine  As needed [address] [place] [id number]  [address] [place] [address] Emergency Department, [address] [place] [address], [address] [facility]th [facility], MD General Surgery Go to  as scheduled for post op check [address] [place] [address]  [phone]               Discharge Medication List as of 11/15/2023 10:00 AM        CONTINUE these medications which have NOT CHANGED    Details   oxyCODONE (Roxicodone) 5 immediate release tablet Take 1 tablet (5 mg total) by mouth every 6 (six) hours as needed for severe pain for up to 10 days Max Daily Amount: 20 mg, Starting Tue 11/14/2023, Until Fri 11/24/2023 at 2359, Normal             No discharge procedures on file.     PDMP Review         Value Time User    PDMP Reviewed  Yes 11/14/2023  8:54 AM Roxy Peña PA-C            ED Provider  Electronically Signed by             Veena Holliday PA-C  11/15/23 1014

## 2023-11-15 NOTE — DISCHARGE INSTRUCTIONS
Rest, plenty of fluids. Throat lozenges, salt water gargles, etc.  Continue ibuprofen, tylenol, pain medication. Follow up with Dr. Misti Rodriguez as scheduled for post op check or return to ER as needed. Return if any worsening pain, difficultly swallowing/drinking, drooling or any additional concerns.

## 2023-11-21 PROCEDURE — 88304 TISSUE EXAM BY PATHOLOGIST: CPT | Performed by: PATHOLOGY

## 2023-11-28 NOTE — PROGRESS NOTES
Post-Op Note - General Surgery   Erendira Rondon 24 y.o. male MRN: [id number]  Encounter: [id number]    Assessment/Plan    Calculus of gallbladder without cholecystitis without obstruction  Doing well after elective lap agnes. Normal exam, incisions healing well. Copy of the op report benign pathology provided and reviewed. Questions answered, we will see back on an as-needed basis. Diagnoses and all orders for this visit:    Calculus of gallbladder without cholecystitis without obstruction        Subjective      Chief Complaint   Patient presents with    Post-op     Lap agnes     Patient is coming in the office for po lap agnes 11-         Review of Systems   All other systems reviewed and are negative. The following portions of the patient's history were reviewed and updated as appropriate: allergies, current medications, past family history, past medical history, past social history, past surgical history, and problem list.    Objective      Temperature 98.9 °F (37.2 °C), temperature source Tympanic. Physical Exam  Vitals and nursing note reviewed. Constitutional:       General: He is not in acute distress. Appearance: He is well-developed. He is not diaphoretic. HENT:      Head: Normocephalic and atraumatic. Eyes:      Conjunctiva/sclera: Conjunctivae normal.      Pupils: Pupils are equal, round, and reactive to light. Pulmonary:      Effort: No respiratory distress. Abdominal:      Comments: Flat soft nondistended and nontender. Incisions healing well. Musculoskeletal:         General: Normal range of motion. Cervical back: Normal range of motion. Skin:     General: Skin is warm and dry. Capillary Refill: Capillary refill takes less than 2 seconds. Neurological:      Mental Status: He is alert and oriented to person, place, and time.    Psychiatric:         Behavior: Behavior normal.         Signature:  Harish Peña PA-C  Date: 12/10/2023 Time: 5:34 PM

## 2023-11-29 ENCOUNTER — OFFICE VISIT (OUTPATIENT)
Dept: SURGERY | Facility: CLINIC | Age: 23
End: 2023-11-29

## 2023-11-29 VITALS — TEMPERATURE: 98.9 F

## 2023-11-29 DIAGNOSIS — K80.20 CALCULUS OF GALLBLADDER WITHOUT CHOLECYSTITIS WITHOUT OBSTRUCTION: Primary | ICD-10-CM

## 2023-11-29 PROCEDURE — 99024 POSTOP FOLLOW-UP VISIT: CPT | Performed by: PHYSICIAN ASSISTANT

## 2023-12-10 NOTE — ASSESSMENT & PLAN NOTE
Doing well after elective lap agnes. Normal exam, incisions healing well. Copy of the op report benign pathology provided and reviewed. Questions answered, we will see back on an as-needed basis.

## (undated) DEVICE — LAPROSCOPIC CHOLANGIOGR. CATH KIT

## (undated) DEVICE — SUT MONOCRYL 4-0 PS-2 27 IN Y426H

## (undated) DEVICE — ASTOUND STANDARD SURGICAL GOWN, XXL: Brand: CONVERTORS

## (undated) DEVICE — LAPAROSCOPIC SCISSORS: Brand: EPIX LAPAROSCOPIC SCISSORS

## (undated) DEVICE — 5 MM CURVED DISSECTORS WITH MONOPOLAR CAUTERY: Brand: ENDOPATH

## (undated) DEVICE — 3M™ TEGADERM™ TRANSPARENT FILM DRESSING FRAME STYLE, 1624W, 2-3/8 IN X 2-3/4 IN (6 CM X 7 CM), 100/CT 4CT/CASE: Brand: 3M™ TEGADERM™

## (undated) DEVICE — GLOVE INDICATOR PI UNDERGLOVE SZ 7 BLUE

## (undated) DEVICE — GLOVE SRG BIOGEL 7.5

## (undated) DEVICE — SUT VICRYL 0 UR-6 27 IN J603H

## (undated) DEVICE — DRAPE C-ARM X-RAY

## (undated) DEVICE — SURGICAL CLIPPER BLADE GENERAL USE

## (undated) DEVICE — DISPOSABLE OR TOWEL: Brand: CARDINAL HEALTH

## (undated) DEVICE — GLOVE SRG BIOGEL 7

## (undated) DEVICE — TROCAR: Brand: KII FIOS FIRST ENTRY

## (undated) DEVICE — CHLORAPREP HI-LITE 26ML ORANGE

## (undated) DEVICE — NEEDLE 25G X 1 1/2

## (undated) DEVICE — SYRINGE 20ML LL

## (undated) DEVICE — TROCAR SITE CLOSURE DEVICE: Brand: ENDO CLOSE

## (undated) DEVICE — CLIP APPLIER WITH CLIP LOGIC TECHNOLOGY: Brand: ENDO CLIP III

## (undated) DEVICE — GLOVE INDICATOR PI UNDERGLOVE SZ 7.5 BLUE

## (undated) DEVICE — SINGLE PORT MANIFOLD: Brand: NEPTUNE 2

## (undated) DEVICE — ASTOUND IMPERVIOUS SURGICAL GOWN: Brand: CONVERTORS

## (undated) DEVICE — GARMENT,MEDLINE,DVT,INT,CALF,FOAM,MED: Brand: MEDLINE

## (undated) DEVICE — HARMONIC 1100 SHEARS, 36CM SHAFT LENGTH: Brand: HARMONIC

## (undated) DEVICE — 2, DISPOSABLE SUCTION/IRRIGATOR WITHOUT DISPOSABLE TIP: Brand: STRYKEFLOW

## (undated) DEVICE — GAUZE SPONGES,8 PLY: Brand: CURITY

## (undated) DEVICE — ENDOPATH XCEL BLADELESS TROCARS WITH STABILITY SLEEVES: Brand: ENDOPATH XCEL

## (undated) DEVICE — GLOVE SRG BIOGEL 6.5

## (undated) DEVICE — TUBE SET SMOKE EVAC PNEUMOCLEAR HIGH FLOW

## (undated) DEVICE — ADHESIVE SKIN HIGH VISCOSITY EXOFIN 1ML

## (undated) DEVICE — CLIPVAC DISPOSABLE FILTER ASSEMBLY - BD SURGICAL CLIPPER: Brand: CLIPVAC

## (undated) DEVICE — INTENDED FOR TISSUE SEPARATION, AND OTHER PROCEDURES THAT REQUIRE A SHARP SURGICAL BLADE TO PUNCTURE OR CUT.: Brand: BARD-PARKER ® CARBON RIB-BACK BLADES

## (undated) DEVICE — ENDOPOUCH RETRIEVER SPECIMEN RETRIEVAL BAGS: Brand: ENDOPOUCH RETRIEVER